# Patient Record
Sex: FEMALE | Race: WHITE | HISPANIC OR LATINO | Employment: FULL TIME | URBAN - METROPOLITAN AREA
[De-identification: names, ages, dates, MRNs, and addresses within clinical notes are randomized per-mention and may not be internally consistent; named-entity substitution may affect disease eponyms.]

---

## 2017-01-12 ENCOUNTER — ROUTINE PRENATAL (OUTPATIENT)
Dept: OBGYN | Facility: CLINIC | Age: 22
End: 2017-01-12
Payer: MEDICAID

## 2017-01-12 VITALS — DIASTOLIC BLOOD PRESSURE: 60 MMHG | WEIGHT: 151 LBS | SYSTOLIC BLOOD PRESSURE: 112 MMHG | BODY MASS INDEX: 27.61 KG/M2

## 2017-01-12 DIAGNOSIS — Z34.01 ENCOUNTER FOR SUPERVISION OF NORMAL FIRST PREGNANCY IN FIRST TRIMESTER: Primary | ICD-10-CM

## 2017-01-12 PROCEDURE — 90040 PR PRENATAL FOLLOW UP: CPT | Performed by: PHYSICIAN ASSISTANT

## 2017-01-12 NOTE — MR AVS SNAPSHOT
Shira Sanchez   2017 3:45 PM   Routine Prenatal   MRN: [id number]    [facility]   Dept Phone:  [phone]    Description:  Female : 1995   Provider:  TONI Wayne           Allergies as of 2017     Allergen Noted Reactions    Latex 2016   Rash    As a child during dental work.      Vital Signs     Blood Pressure Weight Last Menstrual Period Smoking Status          112/60 mmHg 68.493 kg (151 lb) 2016 (Exact Date) Never Smoker         Basic Information     Date Of Birth Sex Race Ethnicity Preferred Language    1995 Female Other  Origin (Puerto Rican,Jordanian,Pakistani,Turkish, etc) English      Your appointments     2017  3:45 PM   OB Follow Up with TONI Wayne   [facility] [address] [address]   [phone]              Problem List              ICD-10-CM Priority Class Noted - Resolved    Encounter for supervision of normal first pregnancy in first trimester Z34.01   2016 - Present      Health Maintenance        Date Due Completion Dates    IMM HEP B VACCINE (1 of 3 - Primary Series) 1997 ---    IMM HEP A VACCINE (1 of 2 - Standard Series) 1998 ---    IMM HPV VACCINE (1 of 3 - Female 3 Dose Series) 2008 ---    IMM VARICELLA (CHICKENPOX) VACCINE (1 of 2 - 2 Dose Adolescent Series) 2010 ---    IMM MENINGOCOCCAL VACCINE (MCV4) (1 of 1) 2013 ---    IMM DTaP/Tdap/Td Vaccine (1 - Tdap) 2016 ---    IMM INFLUENZA (1) 2016 ---            Current Immunizations     No immunizations on file.      Below and/or attached are the medications your provider expects you to take. Review all of your home medications and newly ordered medications with your provider and/or pharmacist. Follow medication instructions as directed by your provider and/or pharmacist. Please keep your medication list with you and share with your provider. Update the information when  medications are discontinued, doses are changed, or new medications (including over-the-counter products) are added; and carry medication information at all times in the event of emergency situations     Allergies:  LATEX - Rash               Medications  Valid as of: January 12, 2017 -  3:21 PM    Generic Name Brand Name Tablet Size Instructions for use    Prenatal MV-Min-Fe Fum-FA-DHA   Take  by mouth.        .                 Medicines prescribed today were sent to:     [facility] #[id number] - [place], [place] - [address]    [address] [address]    Phone: [phone] Fax: [phone]    Open 24 Hours?: No      Medication refill instructions:       If your prescription bottle indicates you have medication refills left, it is not necessary to call your provider’s office. Please contact your pharmacy and they will refill your medication.    If your prescription bottle indicates you do not have any refills left, you may request refills at any time through one of the following ways: The online [employer] system (except Urgent Care), by calling your provider’s office, or by asking your pharmacy to contact your provider’s office with a refill request. Medication refills are processed only during regular business hours and may not be available until the next business day. Your provider may request additional information or to have a follow-up visit with you prior to refilling your medication.   *Please Note: Medication refills are assigned a new Rx number when refilled electronically. Your pharmacy may indicate that no refills were authorized even though a new prescription for the same medication is available at the pharmacy. Please request the medicine by name with the pharmacy before contacting your provider for a refill.        Other Notes About Your Plan     MARLYN Quick Access Code: Activation code not generated  Current [employer] Status: Active

## 2017-01-30 ENCOUNTER — ROUTINE PRENATAL (OUTPATIENT)
Dept: OBGYN | Facility: CLINIC | Age: 22
End: 2017-01-30
Payer: MEDICAID

## 2017-01-30 ENCOUNTER — HOSPITAL ENCOUNTER (OUTPATIENT)
Facility: MEDICAL CENTER | Age: 22
End: 2017-01-30
Attending: NURSE PRACTITIONER
Payer: MEDICAID

## 2017-01-30 VITALS — WEIGHT: 155 LBS | BODY MASS INDEX: 28.34 KG/M2 | SYSTOLIC BLOOD PRESSURE: 108 MMHG | DIASTOLIC BLOOD PRESSURE: 58 MMHG

## 2017-01-30 DIAGNOSIS — Z34.01 ENCOUNTER FOR SUPERVISION OF NORMAL FIRST PREGNANCY IN FIRST TRIMESTER: ICD-10-CM

## 2017-01-30 DIAGNOSIS — Z34.01 ENCOUNTER FOR SUPERVISION OF NORMAL FIRST PREGNANCY IN FIRST TRIMESTER: Primary | ICD-10-CM

## 2017-01-30 PROCEDURE — 87653 STREP B DNA AMP PROBE: CPT

## 2017-01-30 PROCEDURE — 90040 PR PRENATAL FOLLOW UP: CPT | Performed by: NURSE PRACTITIONER

## 2017-01-30 NOTE — PATIENT INSTRUCTIONS
1.  GBS obtained.          2.  Labor precautions given.  Instructions given on where to go.  Pt receptive to              education.          3.  Questions answered.          4.  D/w pt policies about GBS.        5.  Continue FKCs.          6.  Encouraged adequate water intake        7.  F/u 1 wk.

## 2017-01-30 NOTE — PROGRESS NOTES
S:  Pt is  at 35w3d here for routine OB follow up.  No concerns today.  Reports good FM.  Denies VB, LOF, RUCs, or vaginal DC.     O:  Please see above vitals.        FHTs: 125        Fundal ht: 35 cm.        S=D        Fetal position: vertex.    A:  IUP at 35w3d  Patient Active Problem List    Diagnosis Date Noted   • Encounter for supervision of normal first pregnancy in first trimester 2016       P:  1.  GBS obtained.          2.  Labor precautions given.  Instructions given on where to go.  Pt receptive to              education.          3.  Questions answered.          4.  D/w pt policies about GBS.        5.  Continue FKCs.          6.  Encouraged adequate water intake        7.  F/u 1 wk.

## 2017-01-30 NOTE — MR AVS SNAPSHOT
Shira Sanchez   2017 3:00 PM   Routine Prenatal   MRN: [id number]    [facility]   Dept Phone:  [phone]    Description:  Female : 1995   Provider:  DENISE Luevano           Allergies as of 2017     Allergen Noted Reactions    Latex 2016   Rash    As a child during dental work.      You were diagnosed with     Encounter for supervision of normal first pregnancy in first trimester   [600869]  -  Primary       Vital Signs     Blood Pressure Weight Last Menstrual Period Smoking Status          108/58 mmHg 70.308 kg (155 lb) 2016 (Exact Date) Never Smoker         Basic Information     Date Of Birth Sex Race Ethnicity Preferred Language    1995 Female Other  Origin (Citizen of [place],Citizen of the [place],German,Jonathan, etc) English      Problem List              ICD-10-CM Priority Class Noted - Resolved    Encounter for supervision of normal first pregnancy in first trimester Z34.01   2016 - Present      Health Maintenance        Date Due Completion Dates    IMM HEP B VACCINE (1 of 3 - Primary Series) 1995 ---    IMM HEP A VACCINE (1 of 2 - Standard Series) 1998 ---    IMM HPV VACCINE (1 of 3 - Female 3 Dose Series) 2008 ---    IMM VARICELLA (CHICKENPOX) VACCINE (1 of 2 - 2 Dose Adolescent Series) 2010 ---    IMM MENINGOCOCCAL VACCINE (MCV4) (1 of 1) 2013 ---    IMM DTaP/Tdap/Td Vaccine (1 - Tdap) 2016 ---    IMM INFLUENZA (1) 2016 ---            Current Immunizations     No immunizations on file.      Below and/or attached are the medications your provider expects you to take. Review all of your home medications and newly ordered medications with your provider and/or pharmacist. Follow medication instructions as directed by your provider and/or pharmacist. Please keep your medication list with you and share with your provider. Update the information when medications are discontinued, doses are changed, or new  medications (including over-the-counter products) are added; and carry medication information at all times in the event of emergency situations     Allergies:  LATEX - Rash               Medications  Valid as of: January 30, 2017 -  3:29 PM    Generic Name Brand Name Tablet Size Instructions for use    Prenatal MV-Min-Fe Fum-FA-DHA   Take  by mouth.        .                 Medicines prescribed today were sent to:     [facility] #[id number] - [place], [place] - [address]    [address] [address]    Phone: [phone] Fax: [phone]    Open 24 Hours?: No      Medication refill instructions:       If your prescription bottle indicates you have medication refills left, it is not necessary to call your provider’s office. Please contact your pharmacy and they will refill your medication.    If your prescription bottle indicates you do not have any refills left, you may request refills at any time through one of the following ways: The online [employer] system (except Urgent Care), by calling your provider’s office, or by asking your pharmacy to contact your provider’s office with a refill request. Medication refills are processed only during regular business hours and may not be available until the next business day. Your provider may request additional information or to have a follow-up visit with you prior to refilling your medication.   *Please Note: Medication refills are assigned a new Rx number when refilled electronically. Your pharmacy may indicate that no refills were authorized even though a new prescription for the same medication is available at the pharmacy. Please request the medicine by name with the pharmacy before contacting your provider for a refill.        Your To Do List     Future Labs/Procedures Complete By Expires    GRP B STREP, BY PCR (DOSHI BROTH)  As directed 1/31/2018    Comments:    SOURCE: VAGINAL and RECTAL      Instructions          1.  GBS obtained.          2.   Labor precautions given.  Instructions given on where to go.  Pt receptive to              education.          3.  Questions answered.          4.  D/w pt policies about GBS.        5.  Continue FKCs.          6.  Encouraged adequate water intake        7.  F/u 1 wk.         Other Notes About Your Plan     MARLYN Quick Access Code: Activation code not generated  Current [employer] Status: Active

## 2017-01-30 NOTE — PROGRESS NOTES
Pt here today for OB follow up  GBS to be done today  Reports +FM  WT: 155 lb  BP: 108/58   Pt states no complications today  Good # [phone]

## 2017-02-01 LAB — GP B STREP DNA SPEC QL NAA+PROBE: NEGATIVE

## 2017-02-08 ENCOUNTER — ROUTINE PRENATAL (OUTPATIENT)
Dept: OBGYN | Facility: CLINIC | Age: 22
End: 2017-02-08
Payer: MEDICAID

## 2017-02-08 VITALS — WEIGHT: 153 LBS | DIASTOLIC BLOOD PRESSURE: 60 MMHG | SYSTOLIC BLOOD PRESSURE: 110 MMHG | BODY MASS INDEX: 27.98 KG/M2

## 2017-02-08 DIAGNOSIS — Z34.01 ENCOUNTER FOR SUPERVISION OF NORMAL FIRST PREGNANCY IN FIRST TRIMESTER: Primary | ICD-10-CM

## 2017-02-08 PROCEDURE — 90040 PR PRENATAL FOLLOW UP: CPT | Performed by: NURSE PRACTITIONER

## 2017-02-08 NOTE — MR AVS SNAPSHOT
Shira Sanchez   2017 3:15 PM   Routine Prenatal   MRN: [id number]    [facility]   Dept Phone:  [phone]    Description:  Female : 1995   Provider:  DENISE Luevano           Allergies as of 2017     Allergen Noted Reactions    Latex 2016   Rash    As a child during dental work.      You were diagnosed with     Encounter for supervision of normal first pregnancy in first trimester   [[id number]]  -  Primary       Vital Signs     Blood Pressure Weight Last Menstrual Period Smoking Status          110/60 mmHg 69.4 kg (153 lb) 2016 (Exact Date) Never Smoker         Basic Information     Date Of Birth Sex Race Ethnicity Preferred Language    1995 Female Other  Origin (Macedonian,[place]deinig,Cook Islander,Jonathan, etc) English      Your appointments     2017  3:15 PM   OB Follow Up with SHARONA MENG   [facility] [address]  [facility] [address]   [phone]            2017  3:15 PM   OB Follow Up with Loretta Fischer D.N.P.   [facility] [address]  [place] [address]   [phone]            2017  3:15 PM   OB Follow Up with Loretta Fischer D.N.P.   [facility] [address]  [facility] [address]   [phone]              Problem List              ICD-10-CM Priority Class Noted - Resolved    Encounter for supervision of normal first pregnancy in first trimester Z34.01   2016 - Present      Health Maintenance        Date Due Completion Dates    IMM HEP B VACCINE (1 of 3 - Primary Series) 1997 ---    IMM HEP A VACCINE (1 of 2 - Standard Series) 1998 ---    IMM HPV VACCINE (1 of 3 - Female 3 Dose Series) 2008 ---    IMM VARICELLA (CHICKENPOX) VACCINE (1 of 2 - 2 Dose Adolescent Series) 2010 ---    IMM MENINGOCOCCAL VACCINE (MCV4) (1 of 1) 2013 ---    IMM DTaP/Tdap/Td Vaccine (1 - Tdap) 2016 ---    IMM INFLUENZA (1)  9/1/2016 ---            Current Immunizations     No immunizations on file.      Below and/or attached are the medications your provider expects you to take. Review all of your home medications and newly ordered medications with your provider and/or pharmacist. Follow medication instructions as directed by your provider and/or pharmacist. Please keep your medication list with you and share with your provider. Update the information when medications are discontinued, doses are changed, or new medications (including over-the-counter products) are added; and carry medication information at all times in the event of emergency situations     Allergies:  LATEX - Rash               Medications  Valid as of: February 08, 2017 -  3:39 PM    Generic Name Brand Name Tablet Size Instructions for use    Prenatal MV-Min-Fe Fum-FA-DHA   Take  by mouth.        .                 Medicines prescribed today were sent to:     [facility] #[id number] - [place], [place] - [address]    [address] [address]    Phone: [phone] Fax: [phone]    Open 24 Hours?: No      Medication refill instructions:       If your prescription bottle indicates you have medication refills left, it is not necessary to call your provider’s office. Please contact your pharmacy and they will refill your medication.    If your prescription bottle indicates you do not have any refills left, you may request refills at any time through one of the following ways: The online [employer] system (except Urgent Care), by calling your provider’s office, or by asking your pharmacy to contact your provider’s office with a refill request. Medication refills are processed only during regular business hours and may not be available until the next business day. Your provider may request additional information or to have a follow-up visit with you prior to refilling your medication.   *Please Note: Medication refills are assigned a new Rx number when  refilled electronically. Your pharmacy may indicate that no refills were authorized even though a new prescription for the same medication is available at the pharmacy. Please request the medicine by name with the pharmacy before contacting your provider for a refill.        Instructions         1.  Continue FKCs.         2.  Labor precautions given.  Instructions given on where to go.  Pt receptive to education.         3.  D/w pt IOL policy.  IOL request will be sent if not in labor by 40 wks.        4.  Questions answered.         5.  Encouraged adequate water intake       6.  F/u 1wk         Other Notes About Your Plan     MARLYN Quick Access Code: Activation code not generated  Current [employer] Status: Active

## 2017-02-08 NOTE — PATIENT INSTRUCTIONS
1.  Continue FKCs.         2.  Labor precautions given.  Instructions given on where to go.  Pt receptive to education.         3.  D/w pt IOL policy.  IOL request will be sent if not in labor by 40 wks.        4.  Questions answered.         5.  Encouraged adequate water intake       6.  F/u 1wk

## 2017-02-08 NOTE — PROGRESS NOTES
Pt here today for OB follow up  Pt states having some spotting and cramping two days ago.   Reports +FM  WT:153lb  BP:110/60  Good # [phone]  gbs negative

## 2017-02-08 NOTE — PROGRESS NOTES
S:  Pt is  at 36w5d here for routine OB follow up.  No concerns today.  Reports good FM.  Denies VB, LOF, RUCs, or vaginal DC.     O:  Please see above vitals.        FHTs: 125        Fundal ht: 36 cm        Fetal position: vertex        SVE: deferred        GBS negative -- reviewed w pt.      A:  IUP at 36w5d  Patient Active Problem List    Diagnosis Date Noted   • Encounter for supervision of normal first pregnancy in first trimester 2016       P:  1.  Continue FKCs.         2.  Labor precautions given.  Instructions given on where to go.  Pt receptive to education.         3.  D/w pt IOL policy.  IOL request will be sent if not in labor by 40 wks.        4.  Questions answered.         5.  Encouraged adequate water intake       6.  F/u 1wk

## 2017-02-20 ENCOUNTER — HOSPITAL ENCOUNTER (INPATIENT)
Facility: MEDICAL CENTER | Age: 22
LOS: 3 days | End: 2017-02-23
Attending: OBSTETRICS & GYNECOLOGY | Admitting: OBSTETRICS & GYNECOLOGY
Payer: MEDICAID

## 2017-02-20 LAB
BASOPHILS # BLD AUTO: 0.3 % (ref 0–1.8)
BASOPHILS # BLD: 0.04 K/UL (ref 0–0.12)
EOSINOPHIL # BLD AUTO: 0.02 K/UL (ref 0–0.51)
EOSINOPHIL NFR BLD: 0.2 % (ref 0–6.9)
ERYTHROCYTE [DISTWIDTH] IN BLOOD BY AUTOMATED COUNT: 45.1 FL (ref 35.9–50)
HCT VFR BLD AUTO: 39.7 % (ref 37–47)
HGB BLD-MCNC: 13.7 G/DL (ref 12–16)
HOLDING TUBE BB 8507: NORMAL
IMM GRANULOCYTES # BLD AUTO: 0.04 K/UL (ref 0–0.11)
IMM GRANULOCYTES NFR BLD AUTO: 0.3 % (ref 0–0.9)
LYMPHOCYTES # BLD AUTO: 0.92 K/UL (ref 1–4.8)
LYMPHOCYTES NFR BLD: 8 % (ref 22–41)
MCH RBC QN AUTO: 32.7 PG (ref 27–33)
MCHC RBC AUTO-ENTMCNC: 34.5 G/DL (ref 33.6–35)
MCV RBC AUTO: 94.7 FL (ref 81.4–97.8)
MONOCYTES # BLD AUTO: 0.93 K/UL (ref 0–0.85)
MONOCYTES NFR BLD AUTO: 8.1 % (ref 0–13.4)
NEUTROPHILS # BLD AUTO: 9.51 K/UL (ref 2–7.15)
NEUTROPHILS NFR BLD: 83.1 % (ref 44–72)
NRBC # BLD AUTO: 0 K/UL
NRBC BLD AUTO-RTO: 0 /100 WBC
PLATELET # BLD AUTO: 179 K/UL (ref 164–446)
PMV BLD AUTO: 11.5 FL (ref 9–12.9)
RBC # BLD AUTO: 4.19 M/UL (ref 4.2–5.4)
WBC # BLD AUTO: 11.5 K/UL (ref 4.8–10.8)

## 2017-02-20 PROCEDURE — 700111 HCHG RX REV CODE 636 W/ 250 OVERRIDE (IP)

## 2017-02-20 PROCEDURE — 770002 HCHG ROOM/CARE - OB PRIVATE (112)

## 2017-02-20 PROCEDURE — 85025 COMPLETE CBC W/AUTO DIFF WBC: CPT

## 2017-02-20 PROCEDURE — 0KQM0ZZ REPAIR PERINEUM MUSCLE, OPEN APPROACH: ICD-10-PCS | Performed by: OBSTETRICS & GYNECOLOGY

## 2017-02-20 PROCEDURE — 0UQC0ZZ REPAIR CERVIX, OPEN APPROACH: ICD-10-PCS | Performed by: OBSTETRICS & GYNECOLOGY

## 2017-02-20 PROCEDURE — 700101 HCHG RX REV CODE 250

## 2017-02-20 RX ORDER — SODIUM CHLORIDE, SODIUM LACTATE, POTASSIUM CHLORIDE, CALCIUM CHLORIDE 600; 310; 30; 20 MG/100ML; MG/100ML; MG/100ML; MG/100ML
INJECTION, SOLUTION INTRAVENOUS CONTINUOUS
Status: DISPENSED | OUTPATIENT
Start: 2017-02-20 | End: 2017-02-21

## 2017-02-20 RX ORDER — ROPIVACAINE HYDROCHLORIDE 2 MG/ML
INJECTION, SOLUTION EPIDURAL; INFILTRATION; PERINEURAL
Status: ACTIVE
Start: 2017-02-20 | End: 2017-02-21

## 2017-02-20 RX ORDER — ALUMINA, MAGNESIA, AND SIMETHICONE 2400; 2400; 240 MG/30ML; MG/30ML; MG/30ML
30 SUSPENSION ORAL EVERY 6 HOURS PRN
Status: DISCONTINUED | OUTPATIENT
Start: 2017-02-20 | End: 2017-02-21 | Stop reason: HOSPADM

## 2017-02-20 RX ORDER — ONDANSETRON 2 MG/ML
4 INJECTION INTRAMUSCULAR; INTRAVENOUS EVERY 6 HOURS PRN
Status: DISCONTINUED | OUTPATIENT
Start: 2017-02-20 | End: 2017-02-23 | Stop reason: HOSPADM

## 2017-02-20 RX ORDER — SODIUM CHLORIDE, SODIUM LACTATE, POTASSIUM CHLORIDE, CALCIUM CHLORIDE 600; 310; 30; 20 MG/100ML; MG/100ML; MG/100ML; MG/100ML
INJECTION, SOLUTION INTRAVENOUS
Status: COMPLETED
Start: 2017-02-20 | End: 2017-02-20

## 2017-02-20 RX ORDER — METHYLERGONOVINE MALEATE 0.2 MG/ML
0.2 INJECTION INTRAVENOUS
Status: COMPLETED | OUTPATIENT
Start: 2017-02-20 | End: 2017-02-21

## 2017-02-20 RX ORDER — ONDANSETRON 4 MG/1
4 TABLET, ORALLY DISINTEGRATING ORAL EVERY 6 HOURS PRN
Status: DISCONTINUED | OUTPATIENT
Start: 2017-02-20 | End: 2017-02-23 | Stop reason: HOSPADM

## 2017-02-20 RX ORDER — MISOPROSTOL 200 UG/1
800 TABLET ORAL
Status: COMPLETED | OUTPATIENT
Start: 2017-02-20 | End: 2017-02-21

## 2017-02-20 RX ADMIN — SODIUM CHLORIDE, POTASSIUM CHLORIDE, SODIUM LACTATE AND CALCIUM CHLORIDE 1000 ML: 600; 310; 30; 20 INJECTION, SOLUTION INTRAVENOUS at 17:25

## 2017-02-20 RX ADMIN — SODIUM CHLORIDE, POTASSIUM CHLORIDE, SODIUM LACTATE AND CALCIUM CHLORIDE: 600; 310; 30; 20 INJECTION, SOLUTION INTRAVENOUS at 18:12

## 2017-02-20 ASSESSMENT — LIFESTYLE VARIABLES
DO YOU DRINK ALCOHOL: NO
ALCOHOL_USE: NO
EVER_SMOKED: NEVER

## 2017-02-20 NOTE — IP AVS SNAPSHOT
2/23/2017          Shira Sanchez  [address]  [facility] [address]    Dear Shira:    [facility] wants to ensure your discharge home is safe and you or your loved ones have had all your questions answered regarding your care after you leave the hospital.    You may receive a telephone call within two days of your discharge.  This call is to make certain you understand your discharge instructions as well as ensure we provided you with the best care possible during your stay with us.     The call will only last approximately 3-5 minutes and will be done by a nurse.    Once again, we want to ensure your discharge home is safe and that you have a clear understanding of any next steps in your care.  If you have any questions or concerns, please do not hesitate to contact us, we are here for you.  Thank you for choosing [facility] for your healthcare needs.    Sincerely,    Amado Corcoran    [facility]

## 2017-02-20 NOTE — PROGRESS NOTES
Patient came in for ob check.,EFm applied and POC discussed.she is due 3-30 which makes her 38.3weeks.sshe is not leaking,but had some spotting.SVE   4/80/-2 vertex-Dr echavarria notified and patient went walking.

## 2017-02-20 NOTE — IP AVS SNAPSHOT
Home Care Instructions                                                                                                                Shira Sanchez   MRN: [id number]    Department:  POST PARTUM 31   2017           Follow-up Information     1. [facility] with [facility] Schedule an appointment as soon as possible for a visit in 5 weeks.    Specialty:  OB/Gyn    Contact information    [address] [place] [address]  Jon BAÑUELOS [id number]  [phone]         I assume responsibility for securing a follow-up [place] Screening blood test on my baby within the specified date range.    -                  Discharge Instructions       POSTPARTUM DISCHARGE INSTRUCTIONS FOR MOM    YOB: 1995   Age: 21 y.o.               Admit Date: 2017     Discharge Date: 2017  Attending Doctor:  Catrachita Buitrago*                  Allergies:  Latex    Discharged to home by car. Discharged via wheelchair, hospital escort: Yes.  Special equipment needed: Not Applicable  Belongings with: Personal  Be sure to schedule a follow-up appointment with your primary care doctor or any specialists as instructed.     Discharge Plan:   Diet Plan: Discussed  Activity Level: Discussed  Confirmed Follow up Appointment: Patient to Call and Schedule Appointment  Confirmed Symptoms Management: Discussed  Medication Reconciliation Updated: Yes  Influenza Vaccine Indication: Patient Refuses    REASONS TO CALL YOUR OBSTETRICIAN:  1.   Persistent fever or shaking chills (Temperature higher than 100.4)  2.   Heavy bleeding (soaking more than 1 pad per hour); Passing clots  3.   Foul odor from vagina  4.   Mastitis (Breast infection; breast pain, chills, fever, redness)  5.   Urinary pain, burning or frequency  6.   Episiotomy infection  7.   Severe depression longer than 24 hours    HAND WASHING  · Prior to handling the baby.  · Before breastfeeding or bottle feeding baby.  · After using the bathroom or changing the baby's  "diaper.    VAGINAL CARE  · Nothing inside vagina for 6 weeks: no sexual intercourse, tampons or douching.  · Bleeding may continue for 2-4 weeks.  Amount may vary.    · Call your physician for heavy bleeding which means soaking more than 1 pad per hour    BIRTH CONTROL  · It is possible to become pregnant at any time after delivery and while breastfeeding.  · Plan to discuss a method of birth control with your physician at your follow up visit. visit.    DIET AND ELIMINATION  · Eating more fiber (bran cereal, fruits, and vegetables) and drinking plenty of fluids will help to avoid constipation.  · Urinary frequency after childbirth is normal.    POSTPARTUM BLUES  During the first few days after birth, you may experience a sense of the \"blues\" which may include impatience, irritability or even crying.  These feeling come and go quickly.  However, as many as 1 in 10 women experience emotional symptoms known as postpartum depression.    Postpartum depression:  May start as early as the second or third day after delivery or take several weeks or months to develop.  Symptoms of \"blues\" are present, but are more intense:  Crying spells; loss of appetite; feelings of hopelessness or loss of control; fear of touching the baby; over concern or no concern at all about the baby; little or no concern about your own appearance/caring for yourself; and/or inability to sleep or excessive sleeping.  Contact your physician if you are experiencing any of these symptoms.    Crisis Hotline:  · [place] Crisis Hotline:  0-342-ZMQBEBD  Or 1-401.805.4641  · [place] Crisis Hotline:  1-595.794.6729  Or 576-743-3933    PREVENTING SHAKEN BABY:  If you are angry or stressed, PUT THE BABY IN THE CRIB, step away, take some deep breaths, and wait until you are calm to care for the baby.  DO NOT SHAKE THE BABY.  You are not alone, call a supporter for help.    · Crisis Call Center 24/7 crisis line 221-350-3088 or 1-597.362.5970  · You can also text " "them, text \"ANSWER\" to 503049    QUIT SMOKING/TOBACCO USE:  I understand the use of any tobacco products increases my chance of suffering from future heart disease and could cause other illnesses which may shorten my life. Quitting the use of tobacco products is the single most important thing I can do to improve my health. For further information on smoking / tobacco cessation call a Toll Free Quit Line at 1-825.820.1571 (*[employer]) or 1-497.383.7476 (American Lung Association) or you can access the web based program at www.lungusa.org.    · [place] Tobacco Users Help Line:  (143) 961-5082       Toll Free: 1-297.594.4484  · Quit Tobacco Program [facility] (113)879-6468    DEPRESSION / SUICIDE RISK:  As you are discharged from this [facility], it is important to learn how to keep safe from harming yourself.    Recognize the warning signs:  · Abrupt changes in personality, positive or negative- including increase in energy   · Giving away possessions  · Change in eating patterns- significant weight changes-  positive or negative  · Change in sleeping patterns- unable to sleep or sleeping all the time   · Unwillingness or inability to communicate  · Depression  · Unusual sadness, discouragement and loneliness  · Talk of wanting to die  · Neglect of personal appearance   · Rebelliousness- reckless behavior  · Withdrawal from people/activities they love  · Confusion- inability to concentrate     If you or a loved one observes any of these behaviors or has concerns about self-harm, here's what you can do:  · Talk about it- your feelings and reasons for harming yourself  · Remove any means that you might use to hurt yourself (examples: pills, rope, extension cords, firearm)  · Get professional help from the community (Mental Health, Substance Abuse, psychological counseling)  · Do not be alone:Call your Safe Contact- someone whom you trust who will be there for " you.  · Call your local CRISIS HOTLINE 635-0558 or 991-104-1753  · Call your local Children's Mobile Crisis Response Team [place] (750) 898-1754 or www.SecureLink  · Call the toll free National Suicide Prevention Hotlines   · National Suicide Prevention Lifeline 116-427-VITY (3366)  · National Hope Line Network 800-SUICIDE (788-6963)    DISCHARGE SURVEY:  Thank you for choosing [facility].  We hope we provided you with very good care.  You may be receiving a survey in the mail.  Please fill it out.  Your opinion is valuable to us.    ADDITIONAL EDUCATIONAL MATERIALS GIVEN TO PATIENT:        My signature on this form indicates that:  1.  I have reviewed and understand the above information  2.  My questions regarding this information have been answered to my satisfaction.  3.  I have formulated a plan with my discharge nurse to obtain my prescribed medication for home.         Discharge Medication Instructions:    Below are the medications your physician expects you to take upon discharge:    Review all your home medications and newly ordered medications with your doctor and/or pharmacist. Follow medication instructions as directed by your doctor and/or pharmacist.    Please keep your medication list with you and share with your physician.               Medication List      START taking these medications        Instructions     MG Caps   Last time this was given:  100 mg on 2/23/2017  8:32 AM    Take 100 mg by mouth 2 times a day as needed for Constipation.   Dose:  100 mg       ferrous sulfate 325 (65 FE) MG tablet   Last time this was given:  325 mg on 2/23/2017  8:32 AM    Take 1 Tab by mouth 3 times a day.   Dose:  325 mg       ibuprofen 600 MG Tabs   Last time this was given:  600 mg on 2/23/2017  4:55 AM   Commonly known as:  MOTRIN    Take 1 Tab by mouth every 6 hours as needed (For cramping after delivery; do not give if patient is receiving ketorolac (Toradol)).   Dose:  600 mg          CONTINUE taking these medications        Instructions    PRENATAL 1 PO    Take  by mouth.               Crisis Hotline:     [place] Crisis Hotline:  9-038-QQUYDXI or 1-876.273.7503    [place] Crisis Hotline:    1-155.386.7104 or 655-694-5679        Disclaimer           _____________________________________                     __________       ________       Patient/Mother Signature or Legal                          Date                   Time

## 2017-02-20 NOTE — IP AVS SNAPSHOT
Dopplr Access Code: Activation code not generated  Current Dopplr Status: Active    [employer]  A secure, online tool to manage your health information     [employer]’s Dopplr® is a secure, online tool that connects you to your personalized health information from the privacy of your home -- day or night - making it very easy for you to manage your healthcare. Once the activation process is completed, you can even access your medical information using the Dopplr muriel, which is available for free in the Apple Muriel store or Google Play store.     Dopplr provides the following levels of access (as shown below):   My Chart Features   [facility] Primary Care Doctor [facility]  Specialists [facility]  Urgent  Care Non-[facility]  Primary Care  Doctor   Email your healthcare team securely and privately 24/7 X X X X   Manage appointments: schedule your next appointment; view details of past/upcoming appointments X      Request prescription refills. X      View recent personal medical records, including lab and immunizations X X X X   View health record, including health history, allergies, medications X X X X   Read reports about your outpatient visits, procedures, consult and ER notes X X X X   See your discharge summary, which is a recap of your hospital and/or ER visit that includes your diagnosis, lab results, and care plan. X X       How to register for Dopplr:  1. Go to  [email or web address].  2. Click on the Sign Up Now box, which takes you to the New Member Sign Up page. You will need to provide the following information:  a. Enter your Dopplr Access Code exactly as it appears at the top of this page. (You will not need to use this code after you’ve completed the sign-up process. If you do not sign up before the expiration date, you must request a new code.)   b. Enter your date of birth.   c. Enter your home email address.   d. Click Submit, and follow the next screen’s instructions.  3. Create a Dopplr ID. This will  be your [employer] login ID and cannot be changed, so think of one that is secure and easy to remember.  4. Create a [employer] Play password. You can change your password at any time.  5. Enter your Password Reset Question and Answer. This can be used at a later time if you forget your password.   6. Enter your e-mail address. This allows you to receive e-mail notifications when new information is available in [employer].  7. Click Sign Up. You can now view your health information.    For assistance activating your [employer] Play account, call [phone]

## 2017-02-21 LAB
ERYTHROCYTE [DISTWIDTH] IN BLOOD BY AUTOMATED COUNT: 46.9 FL (ref 35.9–50)
HCT VFR BLD AUTO: 31.5 % (ref 37–47)
HGB BLD-MCNC: 10.7 G/DL (ref 12–16)
MCH RBC QN AUTO: 33 PG (ref 27–33)
MCHC RBC AUTO-ENTMCNC: 34 G/DL (ref 33.6–35)
MCV RBC AUTO: 97.2 FL (ref 81.4–97.8)
PLATELET # BLD AUTO: 167 K/UL (ref 164–446)
PMV BLD AUTO: 11.3 FL (ref 9–12.9)
RBC # BLD AUTO: 3.24 M/UL (ref 4.2–5.4)
WBC # BLD AUTO: 14.7 K/UL (ref 4.8–10.8)

## 2017-02-21 PROCEDURE — 700112 HCHG RX REV CODE 229: Performed by: PHYSICIAN ASSISTANT

## 2017-02-21 PROCEDURE — 304965 HCHG RECOVERY SERVICES

## 2017-02-21 PROCEDURE — 700102 HCHG RX REV CODE 250 W/ 637 OVERRIDE(OP): Performed by: OBSTETRICS & GYNECOLOGY

## 2017-02-21 PROCEDURE — 36415 COLL VENOUS BLD VENIPUNCTURE: CPT

## 2017-02-21 PROCEDURE — A9270 NON-COVERED ITEM OR SERVICE: HCPCS | Performed by: PHYSICIAN ASSISTANT

## 2017-02-21 PROCEDURE — 59409 OBSTETRICAL CARE: CPT

## 2017-02-21 PROCEDURE — 770002 HCHG ROOM/CARE - OB PRIVATE (112)

## 2017-02-21 PROCEDURE — 700111 HCHG RX REV CODE 636 W/ 250 OVERRIDE (IP): Performed by: OBSTETRICS & GYNECOLOGY

## 2017-02-21 PROCEDURE — 85027 COMPLETE CBC AUTOMATED: CPT

## 2017-02-21 PROCEDURE — 303615 HCHG EPIDURAL/SPINAL ANESTHESIA FOR LABOR

## 2017-02-21 PROCEDURE — 700102 HCHG RX REV CODE 250 W/ 637 OVERRIDE(OP): Performed by: PHYSICIAN ASSISTANT

## 2017-02-21 PROCEDURE — A9270 NON-COVERED ITEM OR SERVICE: HCPCS | Performed by: OBSTETRICS & GYNECOLOGY

## 2017-02-21 RX ORDER — VITAMIN A ACETATE, BETA CAROTENE, ASCORBIC ACID, CHOLECALCIFEROL, .ALPHA.-TOCOPHEROL ACETATE, DL-, THIAMINE MONONITRATE, RIBOFLAVIN, NIACINAMIDE, PYRIDOXINE HYDROCHLORIDE, FOLIC ACID, CYANOCOBALAMIN, CALCIUM CARBONATE, FERROUS FUMARATE, ZINC OXIDE, CUPRIC OXIDE 3080; 12; 120; 400; 1; 1.84; 3; 20; 22; 920; 25; 200; 27; 10; 2 [IU]/1; UG/1; MG/1; [IU]/1; MG/1; MG/1; MG/1; MG/1; MG/1; [IU]/1; MG/1; MG/1; MG/1; MG/1; MG/1
1 TABLET, FILM COATED ORAL EVERY MORNING
Status: DISCONTINUED | OUTPATIENT
Start: 2017-02-21 | End: 2017-02-23 | Stop reason: HOSPADM

## 2017-02-21 RX ORDER — FERROUS SULFATE 325(65) MG
325 TABLET ORAL 2 TIMES DAILY WITH MEALS
Status: DISCONTINUED | OUTPATIENT
Start: 2017-02-21 | End: 2017-02-23 | Stop reason: HOSPADM

## 2017-02-21 RX ORDER — IBUPROFEN 600 MG/1
600 TABLET ORAL EVERY 6 HOURS PRN
Status: DISCONTINUED | OUTPATIENT
Start: 2017-02-21 | End: 2017-02-23 | Stop reason: HOSPADM

## 2017-02-21 RX ORDER — DOCUSATE SODIUM 100 MG/1
100 CAPSULE, LIQUID FILLED ORAL 2 TIMES DAILY PRN
Status: DISCONTINUED | OUTPATIENT
Start: 2017-02-21 | End: 2017-02-23 | Stop reason: HOSPADM

## 2017-02-21 RX ORDER — MISOPROSTOL 200 UG/1
600 TABLET ORAL
Status: DISCONTINUED | OUTPATIENT
Start: 2017-02-21 | End: 2017-02-22

## 2017-02-21 RX ORDER — METHYLERGONOVINE MALEATE 0.2 MG/ML
0.2 INJECTION INTRAVENOUS
Status: DISCONTINUED | OUTPATIENT
Start: 2017-02-21 | End: 2017-02-22

## 2017-02-21 RX ORDER — OXYCODONE HYDROCHLORIDE AND ACETAMINOPHEN 5; 325 MG/1; MG/1
2 TABLET ORAL EVERY 4 HOURS PRN
Status: DISCONTINUED | OUTPATIENT
Start: 2017-02-21 | End: 2017-02-23 | Stop reason: HOSPADM

## 2017-02-21 RX ORDER — SODIUM CHLORIDE, SODIUM LACTATE, POTASSIUM CHLORIDE, CALCIUM CHLORIDE 600; 310; 30; 20 MG/100ML; MG/100ML; MG/100ML; MG/100ML
INJECTION, SOLUTION INTRAVENOUS PRN
Status: DISCONTINUED | OUTPATIENT
Start: 2017-02-21 | End: 2017-02-22

## 2017-02-21 RX ORDER — OXYCODONE HYDROCHLORIDE AND ACETAMINOPHEN 5; 325 MG/1; MG/1
1 TABLET ORAL EVERY 4 HOURS PRN
Status: DISCONTINUED | OUTPATIENT
Start: 2017-02-21 | End: 2017-02-23 | Stop reason: HOSPADM

## 2017-02-21 RX ADMIN — DOCUSATE SODIUM 100 MG: 100 CAPSULE ORAL at 08:39

## 2017-02-21 RX ADMIN — OXYTOCIN 125 ML/HR: 10 INJECTION, SOLUTION INTRAMUSCULAR; INTRAVENOUS at 03:07

## 2017-02-21 RX ADMIN — IBUPROFEN 600 MG: 600 TABLET, FILM COATED ORAL at 14:41

## 2017-02-21 RX ADMIN — Medication 325 MG: at 08:39

## 2017-02-21 RX ADMIN — Medication 1 TABLET: at 08:39

## 2017-02-21 RX ADMIN — SODIUM CHLORIDE, POTASSIUM CHLORIDE, SODIUM LACTATE AND CALCIUM CHLORIDE 1000 ML: 600; 310; 30; 20 INJECTION, SOLUTION INTRAVENOUS at 02:15

## 2017-02-21 RX ADMIN — METHYLERGONOVINE MALEATE 0.2 MG: 0.2 INJECTION, SOLUTION INTRAMUSCULAR; INTRAVENOUS at 02:13

## 2017-02-21 RX ADMIN — Medication 325 MG: at 17:27

## 2017-02-21 RX ADMIN — IBUPROFEN 600 MG: 600 TABLET, FILM COATED ORAL at 08:39

## 2017-02-21 ASSESSMENT — PAIN SCALES - GENERAL
PAINLEVEL_OUTOF10: 0
PAINLEVEL_OUTOF10: 0
PAINLEVEL_OUTOF10: 2
PAINLEVEL_OUTOF10: 4
PAINLEVEL_OUTOF10: 0
PAINLEVEL_OUTOF10: 4

## 2017-02-21 NOTE — PROGRESS NOTES
Kelli RN brought pt from labor and delivery. ID bands and cuddles checked and verified with labor and delivery nurse, Kelli. Patient oriented to room and surroundings. Skylight discussed. Bulb syring demonstration. Educated on emergency call light. ID bands and security issues discussed. Educated about the pink photo ID name badges. Educated about not sleeping with infant, no carrying infant in halls, and no leaving infant unattended. Assessment completed on patient. Discussed pain management. IV without redness and swelling. Family at bedside.  Encourage pt to call for any needs.

## 2017-02-21 NOTE — DELIVERY NOTE
DATE OF SERVICE:  2017    On 2017 at 0153, this 21-year-old  1, para 0  female with   an intrauterine pregnancy at 38 weeks and 4/7 days under epidural anesthesia,   delivered a viable male infant with Apgar scores of 8 and 9, weighing 7   pounds 8.5 ounces or 3415 g.  Patient progressed to complete, had a very dense   epidural, so the epidural was reduced and allowed the baby to labor down,   when patient felt sensation to push, she did, pushed for over 2-1/2 hours to a   +1, +2 station, but due to maternal exhaustion, Dr. Irizarry was called to   assist with a vacuum-assisted vaginal delivery.  Dr. Irizarry arrived and   assessed and the mushroom vac was applied to the baby's head, pumped to   adequate suction and with 1 contraction, no pop-offs, delivery was via normal   spontaneous vacuum-assisted vaginal delivery to a sterile field in an occiput   anterior position with a loose nuchal cord x1 reduced after delivery of the   infant.  Vacuum was removed and rest of the infant was delivered without   incident.  Patient was admitted in active labor, had a spontaneous rupture of   membranes with clear fluid and progressed to complete, received an epidural,   GBS is negative.  Infant was placed on maternal abdomen and bulb suctioned by   the waiting RN.  RT was called, the baby was vigorous immediately upon   delivery.  Cord was clamped by Dr. Irizarry, cord was cut by the father of   the baby.  An intact placenta with a 3-vessel cord delivered spontaneously at   0202.  Pitocin was then run wide open in the IV.  Patient's fundus was found   to be firm and there were no blood clots; however, patient had significant   bleeding, cervical laceration was noted at 6 o' clock and about 7-8 o'clock   where I assisted to Dr. Irizarry and a repair of the cervix with a 3-0   chromic under epidural anesthesia.  Vagina was also explored and a   first-degree right vaginal laceration and a  second-degree left   vaginal/perineal laceration was noted and both were repaired in a normal   fashion under epidural anesthesia with a 3-0 Vicryl.  Patient tolerated well.    After repair of the lacerations, the cervix was reexamined and found to have   excellent hemostasis.  Fundus was found to be firm.  Estimated blood loss 600   mL.  Dr. Hollingsworth is the attending.  We will get a stat CBC   immediately.       ____________________________________     CLARA VERGARA    APOLONIA / NTS    DD:  02/21/2017 02:58:15  DT:  02/21/2017 03:18:23    D#:  [id number]  Job#:  [id number]

## 2017-02-21 NOTE — PROGRESS NOTES
1715-report received from CLAUDETTE Schwab RN, care assumed, POC discussed, pt transferred to D  1725-IV started, bolus for epidural initiated  1750-Dr Maloney in room, epidural placed, tolerated well  1820-SROM clear fluid, echeverria placed, SVE 7/80/-1  1900-report given to TEOFILO Cruz RN, POC discussed

## 2017-02-21 NOTE — PROGRESS NOTES
Report received from Kristina FERGUSON @ the change of shift.  Patient was asleep upon report. Assumed care. Discussed plan of care especially on managing pain. She would like her Ibuprofen every 6 hours. Assessment done. Medicated for pain. Encouraged ambulation. Encouraged to call if with needs. Will check at intervals.

## 2017-02-21 NOTE — PROGRESS NOTES
"Called to room by RN, as pt is still pushing after 2 hours with progress, but not  yet. Will start Pitocin now, and consider trying different pushing techniques, like tug of war and on side. Pt seems to be getting tired and frustrated. State she \"is just ready.\"    Cervix: C/+1, some caput noted on head, and baby in OT position currently.   NST: Mod variability, variable decels with UCs, +accels.  TOCO: UCs q 1-4 min  Pit = 1 mU/min  T = 99.0    A/P: IUP at 38w4d - try different techniques for 30 more minutes and if no significant change, consider calling MD for assisance with delivery. Anticipate .   "

## 2017-02-21 NOTE — CARE PLAN
Problem: Potential for postpartum infection related to presence of episiotomy/vaginal tear and/or uterine contamination  Goal: Patient will be absent from signs and symptoms of infection  Outcome: PROGRESSING AS EXPECTED  Encouraged ambulation.     Problem: Alteration in comfort related to episiotomy, vaginal repair and/or after birth pains  Goal: Patient is able to ambulate, care for self and infant  Outcome: PROGRESSING AS EXPECTED  Will medicate for pain on schedule as per patient request.

## 2017-02-21 NOTE — PROGRESS NOTES
Pt resting very comfortably with epidural, denies any pressure or pain sensation. Epidural recently turned down to 10.     Cervix: C/+1  NST: Cat 1  TOCO: UCs q 1-3 min    A/P: IUP at 38w3d in active labor - consider reducing epidural even more, as pt has no sensation or urge to push. Start pushing when pt has sensation. Anticipate .

## 2017-02-21 NOTE — PROGRESS NOTES
"1900: Report received from Claudine HAYS RN at bedside.  Repositioned pt from R side to L side-wedged with peanut ball  : Phoned Dr. Maloney regarding epidural continuous rate.  Ok to titrate down from 12 to 10.    : ANÍBAL Blair PA-C at bedside.   : SVE complete/+1.  POC labor down 30min, decrease epidural from 10 to 8-pt still not feeling pressure.  : Pushing  0025: ANÍBAL Blair PA-C called for assessment  0028: 1 of pitocin started  0030: ANÍBAL Blair PA-C at bedside  0145: BLAKE Blair PA-C and Dr. Ratna Domínguez at beside.  Deceleration, pitocin off and 10L O2 by mask applied to pt.  0153: Vacuum applied, head delivered,  viable male.  APGAR 8/9  0202:  placenta, 3vc  0210: Dr. Ratna Domínguez back at bedside for repair  0213: Methergine given IM.  1000ml LR bolus and pitocin bolus.  VSS  0235: Per Dr. Ratna Domínguez and ANÍBAL Blair PA-C, stat cbc without diff.  Lab called to room 214 for lab draw  0300: CBC results available.  ANÍBAL Blair PA-C updated with results.  0440: Pt up to bathroom, gown and pad changed. Unable to void.  Pt nauseous and vomiting.  States she \"feels better\" after vomiting.  0445: Pt transferred to wheelchair from bathroom.  States she \"feels hot and dizzy.\"  VSS  0452: Pt transferred to PPU via wheelchair, 2nd RN pushing baby in crib.  0510: Report given to PHYLLIS Acevedo at bedside.  "

## 2017-02-21 NOTE — H&P
History and Physical      Shira Snachez is a 21 y.o. female  at 38w3d who presents for contractions    Subjective:   positive  For CTXS.   positive Feels pain   negative for LOF  negative for vaginal bleeding.   positive for fetal movement    ROS: A comprehensive review of systems was negative.    No past medical history on file.  No past surgical history on file.  OB History    Para Term  AB SAB TAB Ectopic Multiple Living   1               # Outcome Date GA Lbr Juan F/2nd Weight Sex Delivery Anes PTL Lv   1 Current                 Social History     Social History   • Marital Status: Single     Spouse Name: N/A   • Number of Children: N/A   • Years of Education: N/A     Occupational History   • Not on file.     Social History Main Topics   • Smoking status: Never Smoker    • Smokeless tobacco: Not on file   • Alcohol Use: No      Comment: Socially but not during pregnancy   • Drug Use: No   • Sexual Activity:     Partners: Male      Comment: None     Other Topics Concern   • Not on file     Social History Narrative     Allergies: Latex    Current facility-administered medications:   •  LACTATED RINGERS IV SOLN, , , ,   •  LR infusion, , Intravenous, Continuous, Catrachita Ocampo M.D.  •  fentaNYL (SUBLIMAZE) injection 50 mcg, 50 mcg, Intravenous, Q HOUR PRN, Catrachita Ocampo M.D.  •  fentaNYL (SUBLIMAZE) injection 100 mcg, 100 mcg, Intravenous, Q HOUR PRN, Catrachita Ocampo M.D.  •  mag hydrox-al hydrox-simeth (MAALOX PLUS ES or MYLANTA DS) suspension 30 mL, 30 mL, Oral, Q6HRS PRN, Catrachita Ocampo M.D.  •  oxytocin (PITOCIN) infusion (for induction), 0.5-20 nadine-units/min, Intravenous, Continuous, Catrachita Ocampo M.D.  •  methylergonovine (METHERGINE) injection 0.2 mg, 0.2 mg, Intramuscular, Once PRN, Catrachita Ocampo M.D.  •  misoprostol (CYTOTEC) tablet 800 mcg, 800 mcg, Rectal, Once PRN, Catrachita Ocampo M.D.  •  LACTATED  "RINGERS IV SOLN, , , ,   •  ROPIVACAINE HCL 2 MG/ML INJ SOLN, , , ,     Prenatal care with TPC starting at 13 weeks with following problems:  Patient Active Problem List    Diagnosis Date Noted   • Labor and delivery indication for care or intervention 02/20/2017   • Encounter for supervision of normal first pregnancy in first trimester 08/30/2016     Objective:      Blood pressure 128/62, pulse 92, temperature 36.8 °C (98.2 °F), height 1.549 m (5' 1\"), weight 69.4 kg (153 lb), last menstrual period 05/27/2016, unknown if currently breastfeeding.    General:   no acute distress, alert, cooperative   Skin:   normal   HEENT:  Sclera clear, anicteric   Lungs:   CTA bilateral   Heart:   S1, S2 normal, no murmur, click, rub or gallop, regular rate and rhythm, no pedal edema, no JVD, no hepatosplenomegaly   Abdomen:   gravid, NT   EFW:  [id number]   Pelvis:  adequate with gynecoid pelvis   FHT:  150 BPM   Uterine Size: S=D   Presentations: Cephalic   Cervix:     Dilation: 4    Effacement: 80    Station:  -2    Consistency: Soft    Position: Anterior     Lab Review  Lab:   Blood type: A     Recent Results (from the past 5880 hour(s))   POCT Pregnancy    Collection Time: 08/11/16  3:18 PM   Result Value Ref Range    POC Urine Pregnancy Test POSITIVE Negative    Internal Control Positive Positive     Internal Control Negative Negative    POCT Urinalysis    Collection Time: 08/30/16  9:50 AM   Result Value Ref Range    POC Color  Negative    POC Appearance  Negative    POC Leukocyte Esterase Trace Negative    POC Nitrites Negative Negative    POC Urobiligen  Negative (0.2) mg/dL    POC Protein Negative Negative mg/dL    POC Urine PH 6.0 5.0 - 8.0    POC Blood Negative Negative    POC Specific Gravity 1.020 <1.005 - >1.030    POC Ketones Negative Negative mg/dL    POC Biliruben  Negative mg/dL    POC Glucose Negative Negative mg/dL   CHLAMYDIA/GC PCR URINE OR SWAB    Collection Time: 08/30/16 11:28 AM   Result Value Ref Range "    Source GENITAL     C. trachomatis by PCR Negative Negative    N. gonorrhoeae by PCR Negative Negative   PREG CNTR PRENATAL PN    Collection Time: 08/30/16 11:36 AM   Result Value Ref Range    Color Lt. Yellow     Character Clear     Specific Gravity 1.015 <1.035    Ph 6.0 5.0-8.0    Glucose Negative Negative mg/dL    Ketones 10 (A) Negative mg/dL    Protein Negative Negative mg/dL    Bilirubin Negative Negative    Nitrite Negative Negative    Leukocyte Esterase Negative Negative    Occult Blood Negative Negative    Micro Urine Req see below     Culture Indicated No UA Culture    WBC 9.0 4.8 - 10.8 K/uL    RBC 3.92 (L) 4.20 - 5.40 M/uL    Hemoglobin 12.5 12.0 - 16.0 g/dL    Hematocrit 37.0 37.0 - 47.0 %    MCV 94.4 81.4 - 97.8 fL    MCH 31.9 27.0 - 33.0 pg    MCHC 33.8 33.6 - 35.0 g/dL    RDW 46.4 35.9 - 50.0 fL    Platelet Count 156 (L) 164 - 446 K/uL    MPV 12.2 9.0 - 12.9 fL    Neutrophils-Polys 73.30 (H) 44.00 - 72.00 %    Lymphocytes 16.50 (L) 22.00 - 41.00 %    Monocytes 8.90 0.00 - 13.40 %    Eosinophils 0.90 0.00 - 6.90 %    Basophils 0.20 0.00 - 1.80 %    Immature Granulocytes 0.20 0.00 - 0.90 %    Nucleated RBC 0.00 /100 WBC    Neutrophils (Absolute) 6.59 2.00 - 7.15 K/uL    Lymphs (Absolute) 1.48 1.00 - 4.80 K/uL    Monos (Absolute) 0.80 0.00 - 0.85 K/uL    Eos (Absolute) 0.08 0.00 - 0.51 K/uL    Baso (Absolute) 0.02 0.00 - 0.12 K/uL    Immature Granulocytes (abs) 0.02 0.00 - 0.11 K/uL    NRBC (Absolute) 0.00 K/uL    Rubella IgG Antibody 28.40 IU/mL    Syphilis, Treponemal Qual Non Reactive Non Reactive    Hepatitis B Surface Antigen Negative Negative   HIV ANTIBODIES    Collection Time: 08/30/16 11:36 AM   Result Value Ref Range    HIV Ag/Ab Combo Assay Non Reactive Non Reactive   OP PRENATAL PANEL-BLOOD BANK    Collection Time: 08/30/16 11:36 AM   Result Value Ref Range    ABO Grouping Only A     Rh Grouping Only POS     Antibody Screen Scrn NEG    GLUCOSE 1HR GESTATIONAL    Collection Time: 12/13/16  10:30 AM   Result Value Ref Range    Glucose, Post Dose 121 70 - 139 mg/dL   T.PALLIDUM AB EIA    Collection Time: 16 10:30 AM   Result Value Ref Range    Syphilis, Treponemal Qual Non Reactive Non Reactive   CBC WITHOUT DIFFERENTIAL    Collection Time: 16 10:30 AM   Result Value Ref Range    WBC 9.3 4.8 - 10.8 K/uL    RBC 3.80 (L) 4.20 - 5.40 M/uL    Hemoglobin 12.5 12.0 - 16.0 g/dL    Hematocrit 37.4 37.0 - 47.0 %    MCV 98.4 (H) 81.4 - 97.8 fL    MCH 32.9 27.0 - 33.0 pg    MCHC 33.4 (L) 33.6 - 35.0 g/dL    RDW 49.2 35.9 - 50.0 fL    Platelet Count 214 164 - 446 K/uL    MPV 11.5 9.0 - 12.9 fL   GRP B STREP, BY PCR (DOSHI BROTH)    Collection Time: 17  3:48 PM   Result Value Ref Range    Strep Gp B DNA PCR Negative Negative        Assessment:   Shira Sanchez at 38w3d  Labor status: active labor  Obstetrical history significant for   Patient Active Problem List    Diagnosis Date Noted   • Labor and delivery indication for care or intervention 2017   • Encounter for supervision of normal first pregnancy in first trimester 2016   .      Plan:     Admit to L&D  GBS Negative  Epidural PRN x pain mgt  Pitocin for augmentation  Anticipate

## 2017-02-22 LAB
ERYTHROCYTE [DISTWIDTH] IN BLOOD BY AUTOMATED COUNT: 47.1 FL (ref 35.9–50)
HCT VFR BLD AUTO: 22 % (ref 37–47)
HGB BLD-MCNC: 7.6 G/DL (ref 12–16)
MCH RBC QN AUTO: 33.9 PG (ref 27–33)
MCHC RBC AUTO-ENTMCNC: 34.5 G/DL (ref 33.6–35)
MCV RBC AUTO: 98.2 FL (ref 81.4–97.8)
PLATELET # BLD AUTO: 141 K/UL (ref 164–446)
PMV BLD AUTO: 11.4 FL (ref 9–12.9)
RBC # BLD AUTO: 2.24 M/UL (ref 4.2–5.4)
WBC # BLD AUTO: 13.3 K/UL (ref 4.8–10.8)

## 2017-02-22 PROCEDURE — A9270 NON-COVERED ITEM OR SERVICE: HCPCS | Performed by: PHYSICIAN ASSISTANT

## 2017-02-22 PROCEDURE — 700102 HCHG RX REV CODE 250 W/ 637 OVERRIDE(OP): Performed by: OBSTETRICS & GYNECOLOGY

## 2017-02-22 PROCEDURE — 770002 HCHG ROOM/CARE - OB PRIVATE (112)

## 2017-02-22 PROCEDURE — 700112 HCHG RX REV CODE 229: Performed by: PHYSICIAN ASSISTANT

## 2017-02-22 PROCEDURE — 36415 COLL VENOUS BLD VENIPUNCTURE: CPT

## 2017-02-22 PROCEDURE — 85027 COMPLETE CBC AUTOMATED: CPT

## 2017-02-22 PROCEDURE — A9270 NON-COVERED ITEM OR SERVICE: HCPCS | Performed by: OBSTETRICS & GYNECOLOGY

## 2017-02-22 PROCEDURE — 700102 HCHG RX REV CODE 250 W/ 637 OVERRIDE(OP): Performed by: PHYSICIAN ASSISTANT

## 2017-02-22 RX ORDER — DOCUSATE SODIUM 100 MG/1
100 CAPSULE, LIQUID FILLED ORAL 2 TIMES DAILY PRN
Status: DISCONTINUED | OUTPATIENT
Start: 2017-02-22 | End: 2017-02-22

## 2017-02-22 RX ORDER — MISOPROSTOL 200 UG/1
800 TABLET ORAL
Status: DISCONTINUED | OUTPATIENT
Start: 2017-02-22 | End: 2017-02-23 | Stop reason: HOSPADM

## 2017-02-22 RX ORDER — METHYLERGONOVINE MALEATE 0.2 MG/ML
0.2 INJECTION INTRAVENOUS
Status: DISCONTINUED | OUTPATIENT
Start: 2017-02-22 | End: 2017-02-23 | Stop reason: HOSPADM

## 2017-02-22 RX ORDER — SODIUM CHLORIDE, SODIUM LACTATE, POTASSIUM CHLORIDE, CALCIUM CHLORIDE 600; 310; 30; 20 MG/100ML; MG/100ML; MG/100ML; MG/100ML
INJECTION, SOLUTION INTRAVENOUS PRN
Status: DISCONTINUED | OUTPATIENT
Start: 2017-02-22 | End: 2017-02-23 | Stop reason: HOSPADM

## 2017-02-22 RX ORDER — VITAMIN A ACETATE, BETA CAROTENE, ASCORBIC ACID, CHOLECALCIFEROL, .ALPHA.-TOCOPHEROL ACETATE, DL-, THIAMINE MONONITRATE, RIBOFLAVIN, NIACINAMIDE, PYRIDOXINE HYDROCHLORIDE, FOLIC ACID, CYANOCOBALAMIN, CALCIUM CARBONATE, FERROUS FUMARATE, ZINC OXIDE, CUPRIC OXIDE 3080; 12; 120; 400; 1; 1.84; 3; 20; 22; 920; 25; 200; 27; 10; 2 [IU]/1; UG/1; MG/1; [IU]/1; MG/1; MG/1; MG/1; MG/1; MG/1; [IU]/1; MG/1; MG/1; MG/1; MG/1; MG/1
1 TABLET, FILM COATED ORAL EVERY MORNING
Status: DISCONTINUED | OUTPATIENT
Start: 2017-02-22 | End: 2017-02-22

## 2017-02-22 RX ADMIN — IBUPROFEN 600 MG: 600 TABLET, FILM COATED ORAL at 00:43

## 2017-02-22 RX ADMIN — Medication 1 TABLET: at 08:54

## 2017-02-22 RX ADMIN — Medication 325 MG: at 17:22

## 2017-02-22 RX ADMIN — Medication 325 MG: at 08:54

## 2017-02-22 RX ADMIN — DOCUSATE SODIUM 100 MG: 100 CAPSULE ORAL at 08:54

## 2017-02-22 RX ADMIN — DOCUSATE SODIUM 100 MG: 100 CAPSULE ORAL at 00:43

## 2017-02-22 RX ADMIN — IBUPROFEN 600 MG: 600 TABLET, FILM COATED ORAL at 08:54

## 2017-02-22 ASSESSMENT — PAIN SCALES - GENERAL
PAINLEVEL_OUTOF10: 0
PAINLEVEL_OUTOF10: 3
PAINLEVEL_OUTOF10: 0
PAINLEVEL_OUTOF10: 0
PAINLEVEL_OUTOF10: 3

## 2017-02-22 NOTE — CARE PLAN
Problem: Altered physiologic condition related to immediate post-delivery state and potential for bleeding/hemorrhage  Goal: Patient physiologically stable as evidenced by normal lochia, palpable uterine involution and vital signs within normal limits  Outcome: PROGRESSING AS EXPECTED  Fundus is firm and one finger breadth above the umbilicus. Lochia is light. Vital signs are within normal limits.     Problem: Alteration in comfort related to episiotomy, vaginal repair and/or after birth pains  Goal: Patient is able to ambulate, care for self and infant  Outcome: PROGRESSING AS EXPECTED  Patient reports pain of 0/10 and declines any pain medication at this time. Will continue to assess for pain.

## 2017-02-22 NOTE — CARE PLAN
Problem: Potential for postpartum infection related to presence of episiotomy/vaginal tear and/or uterine contamination  Goal: Patient will be absent from signs and symptoms of infection  Outcome: PROGRESSING AS EXPECTED  Encouraged ambulation.     Problem: Alteration in comfort related to episiotomy, vaginal repair and/or after birth pains  Goal: Patient is able to ambulate, care for self and infant  Outcome: PROGRESSING AS EXPECTED  Will medicate for pain as needed.

## 2017-02-22 NOTE — PROGRESS NOTES
Report received from Martha FERGUSON @ 0700. Patient was asleep. Assumed care. Discussed plan of care especially on managing pain. she would like to call for pain medication if needed.  Assessment done. Encouraged to call if with needs. Will check at intervals.

## 2017-02-22 NOTE — PROGRESS NOTES
Patient assessment complete. Fundus is firm with minimal discharge. Pt ambulates and voids. Plan of care discussed with patient and all questions answered. Encouraged to call with call light.   MOB could not latch infant, this RN assisted but no latch still. Started MOB on pump and Mother decided to use donor milk instead of formula. Donor milk consent signed and in infant's chart. Will continue to monitor.

## 2017-02-22 NOTE — PROGRESS NOTES
[facility] POSTPARTUM PROGRESS NOTE    PATIENT ID:  NAME:  Shira Sanchez  MRN:               [id number]  YOB: 1995     21 y.o. female  at 38w4d PPD#1 s/p      Subjective: Patient is doing well, minimal pain and bleeding. Voiding normally, tolerating regular diet. She requests to stay until tomorrow to work on breastfeeding. Pt denies lightheadedness/dizziness, headache, CP or palpitations.    Objective:    Filed Vitals:    17 0500 17 0806 17 1200 17   BP: 103/70 95/62 107/73 112/58   Pulse: 92 93 100 100   Temp: 36.9 °C (98.4 °F) 37 °C (98.6 °F) 36.4 °C (97.6 °F) 36.4 °C (97.6 °F)   Resp: 19 18 20 20   Height:       Weight:       SpO2: 98% 97%  98%     General: No acute distress, resting comfortably in bed.  HEENT: normocephalic, nontraumatic, PERRLA, EOMI  Cardiovascular: Heart RRR with no murmurs, rubs or gallops. Distal Pulses 2+  Respiratory: symmetric chest expansion, lungs CTA bilaterally with no wheezes rales or rhonci  Abdomen: soft, mildly tender, fundus firm, +BS  Genitourinary: lochia light, denies excessive vaginal bleeding  Musculoskeletal: strength 5/5 in four extremities  Neuro: non focal with no numbness, tingling or changes in sensation    Recent Labs      17   1725  17   0249  17   0447   WBC  11.5*  14.7*  13.3*   RBC  4.19*  3.24*  2.24*   HEMOGLOBIN  13.7  10.7*  7.6*   HEMATOCRIT  39.7  31.5*  22.0*   MCV  94.7  97.2  98.2*   MCH  32.7  33.0  33.9*   RDW  45.1  46.9  47.1   PLATELETCT  179  167  141*   MPV  11.5  11.3  11.4   NEUTSPOLYS  83.10*   --    --    LYMPHOCYTES  8.00*   --    --    MONOCYTES  8.10   --    --    EOSINOPHILS  0.20   --    --    BASOPHILS  0.30   --    --      No results for input(s): SODIUM, POTASSIUM, CHLORIDE, CO2, GLUCOSE, BUN, CPKTOTAL in the last 72 hours.    Current Meds:   Current Facility-Administered Medications   Medication Dose Frequency Provider Last Rate Last Dose   • ibuprofen (MOTRIN) tablet  600 mg  600 mg Q6HRS PRN Catrachita Ocampo M.D.   600 mg at 17 0043   • oxycodone-acetaminophen (PERCOCET) 5-325 MG per tablet 1 Tab  1 Tab Q4HRS PRN Catrachita Ocampo M.D.       • oxycodone-acetaminophen (PERCOCET) 5-325 MG per tablet 2 Tab  2 Tab Q4HRS PRN Catrachita Ocampo M.D.       • LR infusion   PRN Tutu Blair, P.A.       • methylergonovine (METHERGINE) injection 0.2 mg  0.2 mg Once PRN Tamstrisha Yateson, P.A.       • misoprostol (CYTOTEC) tablet 600 mcg  600 mcg Once PRN Tamstrisha Yateson, P.A.       • PRN oxytocin (PITOCIN) (20 Units/1000 mL) PRN for excessive uterine bleeding - See Admin Instr  125-999 mL/hr Once PRN Tamstrisha Yateson, P.A.       • docusate sodium (COLACE) capsule 100 mg  100 mg BID PRN Tamstrisha Yateson, P.A.   100 mg at 17 0043   • prenatal plus vitamin (STUARTNATAL 1+1) 27-1 MG tablet 1 Tab  1 Tab QAM Nicoleen RONNY Yateson, P.A.   1 Tab at 17 0839   • ferrous sulfate tablet 325 mg  325 mg BID WITH MEALS Tamstrisha Blair, P.A.   325 mg at 17 1727   • ondansetron (ZOFRAN ODT) dispertab 4 mg  4 mg Q6HRS PRN Catrachita Ocampo M.D.        Or   • ondansetron (ZOFRAN) syringe/vial injection 4 mg  4 mg Q6HRS PRN Catrachita Ocampo M.D.       • oxytocin (PITOCIN) infusion (for postpartum)   mL/hr Continuous Catrachita Ocampo M.D. 125 mL/hr at 17 0307 125 mL/hr at 17 0307     Last reviewed on 2017  6:06 PM by Claudine Vashishta, R.N.     Assessment:   21 y.o. female  at 38w4d PPD#1 s/p      Plan:   1. Routine post-partum care  2. Encourage ambulation  3. Ferrous sulfate 325mg BID  4. Disposition: Anticipate d/c PPD#2

## 2017-02-23 VITALS
OXYGEN SATURATION: 96 % | HEART RATE: 91 BPM | RESPIRATION RATE: 18 BRPM | WEIGHT: 153 LBS | DIASTOLIC BLOOD PRESSURE: 63 MMHG | TEMPERATURE: 97.7 F | SYSTOLIC BLOOD PRESSURE: 97 MMHG | BODY MASS INDEX: 28.89 KG/M2 | HEIGHT: 61 IN

## 2017-02-23 LAB
ERYTHROCYTE [DISTWIDTH] IN BLOOD BY AUTOMATED COUNT: 47.8 FL (ref 35.9–50)
HCT VFR BLD AUTO: 22.2 % (ref 37–47)
HGB BLD-MCNC: 7.5 G/DL (ref 12–16)
MCH RBC QN AUTO: 33 PG (ref 27–33)
MCHC RBC AUTO-ENTMCNC: 33.8 G/DL (ref 33.6–35)
MCV RBC AUTO: 97.8 FL (ref 81.4–97.8)
PLATELET # BLD AUTO: 161 K/UL (ref 164–446)
PMV BLD AUTO: 11.2 FL (ref 9–12.9)
RBC # BLD AUTO: 2.27 M/UL (ref 4.2–5.4)
WBC # BLD AUTO: 11.9 K/UL (ref 4.8–10.8)

## 2017-02-23 PROCEDURE — 700102 HCHG RX REV CODE 250 W/ 637 OVERRIDE(OP): Performed by: OBSTETRICS & GYNECOLOGY

## 2017-02-23 PROCEDURE — A9270 NON-COVERED ITEM OR SERVICE: HCPCS | Performed by: PHYSICIAN ASSISTANT

## 2017-02-23 PROCEDURE — 85027 COMPLETE CBC AUTOMATED: CPT

## 2017-02-23 PROCEDURE — 36415 COLL VENOUS BLD VENIPUNCTURE: CPT

## 2017-02-23 PROCEDURE — 700102 HCHG RX REV CODE 250 W/ 637 OVERRIDE(OP): Performed by: PHYSICIAN ASSISTANT

## 2017-02-23 PROCEDURE — 700112 HCHG RX REV CODE 229: Performed by: PHYSICIAN ASSISTANT

## 2017-02-23 PROCEDURE — A9270 NON-COVERED ITEM OR SERVICE: HCPCS | Performed by: OBSTETRICS & GYNECOLOGY

## 2017-02-23 RX ORDER — PSEUDOEPHEDRINE HCL 30 MG
100 TABLET ORAL 2 TIMES DAILY PRN
Qty: 60 CAP | Refills: 3 | Status: SHIPPED | OUTPATIENT
Start: 2017-02-23 | End: 2017-11-02

## 2017-02-23 RX ORDER — FERROUS SULFATE 325(65) MG
325 TABLET ORAL 3 TIMES DAILY
Qty: 90 TAB | Refills: 3 | Status: SHIPPED | OUTPATIENT
Start: 2017-02-23 | End: 2017-11-02

## 2017-02-23 RX ORDER — IBUPROFEN 600 MG/1
600 TABLET ORAL EVERY 6 HOURS PRN
Qty: 30 TAB | Refills: 3 | Status: SHIPPED | OUTPATIENT
Start: 2017-02-23 | End: 2017-11-02

## 2017-02-23 RX ADMIN — Medication 1 TABLET: at 08:32

## 2017-02-23 RX ADMIN — DOCUSATE SODIUM 100 MG: 100 CAPSULE ORAL at 08:32

## 2017-02-23 RX ADMIN — OXYCODONE HYDROCHLORIDE AND ACETAMINOPHEN 1 TABLET: 5; 325 TABLET ORAL at 04:55

## 2017-02-23 RX ADMIN — Medication 325 MG: at 08:32

## 2017-02-23 RX ADMIN — IBUPROFEN 600 MG: 600 TABLET, FILM COATED ORAL at 04:55

## 2017-02-23 ASSESSMENT — PAIN SCALES - GENERAL
PAINLEVEL_OUTOF10: 0
PAINLEVEL_OUTOF10: 2
PAINLEVEL_OUTOF10: 0
PAINLEVEL_OUTOF10: 0
PAINLEVEL_OUTOF10: 5
PAINLEVEL_OUTOF10: 0

## 2017-02-23 NOTE — PROGRESS NOTES
0730-Patient alert, oriented.  Vital signs stable. Father of baby at bedside.  Fundus firm@U, lochia light.  Patient using stef bottle, voiding without difficulty.  Patient denies complaints of pain at this time.  Encouraged to call with needs.  Will continue to monitor.  Patient bonding well with infant.  1200- Discharge education discussed with patient.  Patient verbalized understanding.  1245- Patient discharged home via wheelchair with infant in stable condition.

## 2017-02-23 NOTE — PROGRESS NOTES
Mother states baby continues having difficulty breastfeeding, flat/inverted nipples, mother has been attempting breastfeeding/pumping/supplementing, mother states she has [place] [facility], mother to call WIC office before discharging today to attempt to arrange for breast pump for home, HG rental pump information also provided, if unable to get WIC pump or rent HG pump mother to call and staff will order hand pump for her, encouraged to call after speaks with WIC representative, educated on importance of continuing to supplement since infant not breastfeeding, supplementation guidelines provided and explained and mother to supplement per guidelines with pumped MBM and/or formula.

## 2017-02-23 NOTE — DISCHARGE SUMMARY
[facility]  NORMAL SPONTANEOUS VAGINAL DISCHARGE SUMMARY    PATIENT ID:  NAME:  Shira Sanchez  MRN:               [id number]  YOB: 1995    DATE OF ADMISSION: 2017    DATE OF DISCHARGE: 2017     ADMITTING DIAGNOSIS:  1. Intrauterine pregnancy at 38w4d.      DISCHARGE DIAGNOSIS:  1. s/p   2. Cervical Laceration  3. Post-partum hemorrhage        HOSPITAL COURSE: This is a 21 y.o. year old  female admitted at 38w4d who presented with contractions in active labor. Pregnancy was uncomplicated. The patient had a good labor pattern after admission and proceeded to deliver a viable male infant weighing  7 lbs and 8.5 oz. Infants Apgars scores were 8 and 9 at one and five minutes. A cervical laceration was noted which was the source of the majority of the patient's blood loss, EBL 600cc. The patients postpartum course was uncomplicated however was notable for anemia, Hgb 7.5, patient was asymptomatic, tx with supplemental iron to be continued at home,  and she was discharged home in stable condition on postpartum day #2.    PROCEDURES PERFORMED: Normal spontaneous vaginal delivery over first degree right vaginal laceration and a second degree left vaginal laceration which were repaired with 3-0 vicryl in the usual sterile fashion. Cervical laceration was noted and repaired with 3-0 chromic.     COMPLICATIONS: Cervical laceration with post-partum hemorrhage, EBL 600cc    DIET: Regular    ACTIVITY: No intercourse and nothing inserted into the vagina for 5 weeks.    MEDICATIONS:  Current Outpatient Prescriptions   Medication Sig Dispense Refill   • ibuprofen (MOTRIN) 600 MG Tab Take 1 Tab by mouth every 6 hours as needed (For cramping after delivery; do not give if patient is receiving ketorolac (Toradol)). 30 Tab 3   • ferrous sulfate 325 (65 FE) MG tablet Take 1 Tab by mouth 3 times a day. 90 Tab 3   • docusate sodium 100 MG Cap Take 100 mg by mouth 2 times a day as needed for Constipation. 60 Cap 3          FOLLOWUP:  1) TPC in 5 weeks  2) Return to the hospital if copious vaginal bleeding or foul smelling discharge is noted

## 2017-02-23 NOTE — CARE PLAN
Problem: Altered physiologic condition related to immediate post-delivery state and potential for bleeding/hemorrhage  Goal: Patient physiologically stable as evidenced by normal lochia, palpable uterine involution and vital signs within normal limits  Outcome: PROGRESSING AS EXPECTED  Fundus firm @ U,lochia rubra minimal. V/S within defined limits.    Problem: Alteration in comfort related to episiotomy, vaginal repair and/or after birth pains  Goal: Patient verbalizes acceptable pain level  Outcome: PROGRESSING AS EXPECTED  Patient will be medicated as needed. Verbalized acceptable pain level @ this time.

## 2017-02-23 NOTE — DISCHARGE INSTRUCTIONS
POSTPARTUM DISCHARGE INSTRUCTIONS FOR MOM    YOB: 1995   Age: 21 y.o.               Admit Date: 2/20/2017     Discharge Date: 2/23/2017  Attending Doctor:  Catrachita Buitrago*                  Allergies:  Latex    Discharged to home by car. Discharged via wheelchair, hospital escort: Yes.  Special equipment needed: Not Applicable  Belongings with: Personal  Be sure to schedule a follow-up appointment with your primary care doctor or any specialists as instructed.     Discharge Plan:   Diet Plan: Discussed  Activity Level: Discussed  Confirmed Follow up Appointment: Patient to Call and Schedule Appointment  Confirmed Symptoms Management: Discussed  Medication Reconciliation Updated: Yes  Influenza Vaccine Indication: Patient Refuses    REASONS TO CALL YOUR OBSTETRICIAN:  1.   Persistent fever or shaking chills (Temperature higher than 100.4)  2.   Heavy bleeding (soaking more than 1 pad per hour); Passing clots  3.   Foul odor from vagina  4.   Mastitis (Breast infection; breast pain, chills, fever, redness)  5.   Urinary pain, burning or frequency  6.   Episiotomy infection  7.   Severe depression longer than 24 hours    HAND WASHING  · Prior to handling the baby.  · Before breastfeeding or bottle feeding baby.  · After using the bathroom or changing the baby's diaper.    VAGINAL CARE  · Nothing inside vagina for 6 weeks: no sexual intercourse, tampons or douching.  · Bleeding may continue for 2-4 weeks.  Amount may vary.    · Call your physician for heavy bleeding which means soaking more than 1 pad per hour    BIRTH CONTROL  · It is possible to become pregnant at any time after delivery and while breastfeeding.  · Plan to discuss a method of birth control with your physician at your follow up visit. visit.    DIET AND ELIMINATION  · Eating more fiber (bran cereal, fruits, and vegetables) and drinking plenty of fluids will help to avoid constipation.  · Urinary frequency after childbirth is  "normal.    POSTPARTUM BLUES  During the first few days after birth, you may experience a sense of the \"blues\" which may include impatience, irritability or even crying.  These feeling come and go quickly.  However, as many as 1 in 10 women experience emotional symptoms known as postpartum depression.    Postpartum depression:  May start as early as the second or third day after delivery or take several weeks or months to develop.  Symptoms of \"blues\" are present, but are more intense:  Crying spells; loss of appetite; feelings of hopelessness or loss of control; fear of touching the baby; over concern or no concern at all about the baby; little or no concern about your own appearance/caring for yourself; and/or inability to sleep or excessive sleeping.  Contact your physician if you are experiencing any of these symptoms.    Crisis Hotline:  · [place] Crisis Hotline:  3-729-XSMAJWX  Or 1-215.440.2407  · [place] Crisis Hotline:  1-632.631.6370  Or 730-891-1572    PREVENTING SHAKEN BABY:  If you are angry or stressed, PUT THE BABY IN THE CRIB, step away, take some deep breaths, and wait until you are calm to care for the baby.  DO NOT SHAKE THE BABY.  You are not alone, call a supporter for help.    · Crisis Call Center 24/7 crisis line 989-250-0633 or 1-931.429.5912  · You can also text them, text \"ANSWER\" to 429124    QUIT SMOKING/TOBACCO USE:  I understand the use of any tobacco products increases my chance of suffering from future heart disease and could cause other illnesses which may shorten my life. Quitting the use of tobacco products is the single most important thing I can do to improve my health. For further information on smoking / tobacco cessation call a Toll Free Quit Line at 1-213.915.5938 (*[employer] [place]) or 1-559.439.9004 (American [employer] Association) or you can access the web based program at www.lungusa.org.    · [place] Tobacco Users Help Line:  (281) 832-7915       Toll Free: " 5-528-426-9860  · Quit Tobacco Program [facility] (174)673-0894    DEPRESSION / SUICIDE RISK:  As you are discharged from this [facility], it is important to learn how to keep safe from harming yourself.    Recognize the warning signs:  · Abrupt changes in personality, positive or negative- including increase in energy   · Giving away possessions  · Change in eating patterns- significant weight changes-  positive or negative  · Change in sleeping patterns- unable to sleep or sleeping all the time   · Unwillingness or inability to communicate  · Depression  · Unusual sadness, discouragement and loneliness  · Talk of wanting to die  · Neglect of personal appearance   · Rebelliousness- reckless behavior  · Withdrawal from people/activities they love  · Confusion- inability to concentrate     If you or a loved one observes any of these behaviors or has concerns about self-harm, here's what you can do:  · Talk about it- your feelings and reasons for harming yourself  · Remove any means that you might use to hurt yourself (examples: pills, rope, extension cords, firearm)  · Get professional help from the community (Mental Health, Substance Abuse, psychological counseling)  · Do not be alone:Call your Safe Contact- someone whom you trust who will be there for you.  · Call your local CRISIS HOTLINE 121-1252 or 442-235-3662  · Call your local Children's Mobile Crisis Response Team [place] (970) 670-8946 or www.Bitmenu  · Call the toll free National Suicide Prevention Hotlines   · National Suicide Prevention Lifeline 433-401-MQJJ (8347)  · National Hope Line Network 800-SUICIDE (128-5692)    DISCHARGE SURVEY:  Thank you for choosing [facility].  We hope we provided you with very good care.  You may be receiving a survey in the mail.  Please fill it out.  Your opinion is valuable to us.    ADDITIONAL EDUCATIONAL MATERIALS GIVEN TO PATIENT:        My signature on this form  indicates that:  1.  I have reviewed and understand the above information  2.  My questions regarding this information have been answered to my satisfaction.  3.  I have formulated a plan with my discharge nurse to obtain my prescribed medication for home.

## 2017-03-03 ENCOUNTER — TELEPHONE (OUTPATIENT)
Dept: OBGYN | Facility: CLINIC | Age: 22
End: 2017-03-03

## 2017-03-03 ENCOUNTER — POST PARTUM (OUTPATIENT)
Dept: OBGYN | Facility: CLINIC | Age: 22
End: 2017-03-03
Payer: MEDICAID

## 2017-03-03 VITALS — DIASTOLIC BLOOD PRESSURE: 62 MMHG | BODY MASS INDEX: 25.33 KG/M2 | SYSTOLIC BLOOD PRESSURE: 110 MMHG | WEIGHT: 134 LBS

## 2017-03-03 DIAGNOSIS — N61.0 MASTITIS, LEFT, ACUTE: ICD-10-CM

## 2017-03-03 PROCEDURE — 90050 PR POSTPARTUM VISIT: CPT | Performed by: NURSE PRACTITIONER

## 2017-03-03 RX ORDER — DICLOXACILLIN SODIUM 250 MG/1
500 CAPSULE ORAL EVERY 6 HOURS
Qty: 80 CAP | Refills: 0 | Status: SHIPPED | OUTPATIENT
Start: 2017-03-03 | End: 2017-03-13

## 2017-03-03 NOTE — TELEPHONE ENCOUNTER
Pt called the triage line c/o breast pain in both breast and a fever.  I called Pt back- no answer-left message to call me back at [phone].

## 2017-03-03 NOTE — PATIENT INSTRUCTIONS
A/P: 1) Mastitis: abx sent to pharmacy.           2) Continue breastfeeding.  D/w pt helps.            3) F/u regular PP visit.

## 2017-03-03 NOTE — PROGRESS NOTES
S: Pt here for fit in c/o fevers/chills.  Doing well nursing at home.  States lochia has stopped.    O: T: 98.3       Left breast: red, warm to touch, painful       Right breast: wnl       Fundus firm 2fb above PS.        A/P: 1) Mastitis: abx sent to pharmacy.           2) Continue breastfeeding.  D/w pt helps.            3) F/u regular PP visit.

## 2017-03-03 NOTE — MR AVS SNAPSHOT
Shira Sanchez   3/3/2017 2:00 PM   Post Partum   MRN: [id number]    [facility]   Dept Phone:  [phone]    Description:  Female : 1995   Provider:  Sherie Ndiaye C.N.M.           Allergies as of 3/3/2017     Allergen Noted Reactions    Latex 2016   Rash    As a child during dental work.      You were diagnosed with     Postpartum care and examination of lactating mother   [V24.1.ICD-9-CM]  -  Primary     Mastitis, left, acute   [607298]         Vital Signs     Blood Pressure Weight Last Menstrual Period Breastfeeding? Smoking Status       110/62 mmHg 60.782 kg (134 lb) 2016 (Exact Date) Unknown Never Smoker        Basic Information     Date Of Birth Sex Race Ethnicity Preferred Language    1995 Female White  Origin (Pashto,Libyan,Georgian,Jonathan, etc) English      Your appointments     2017 10:00 AM   Post Partum with Sherie Ndiaye C.N.M.   [facility] ([facility] [place])    [address] [place] [id number] [phone]              Problem List              ICD-10-CM Priority Class Noted - Resolved    Encounter for supervision of normal first pregnancy in first trimester Z34.01   2016 - Present    Labor and delivery indication for care or intervention O75.9   2017 - Present      Health Maintenance        Date Due Completion Dates    IMM HEP B VACCINE (1 of 3 - Primary Series) 1997 ---    IMM HEP A VACCINE (1 of 2 - Standard Series) 1998 ---    IMM HPV VACCINE (1 of 3 - Female 3 Dose Series) 2008 ---    IMM VARICELLA (CHICKENPOX) VACCINE (1 of 2 - 2 Dose Adolescent Series) 2010 ---    IMM MENINGOCOCCAL VACCINE (MCV4) (1 of 1) 2013 ---    IMM DTaP/Tdap/Td Vaccine (1 - Tdap) 2016 ---    IMM INFLUENZA (1) 2016 ---            Current Immunizations     No immunizations on file.      Below and/or attached are the medications your provider expects you to take. Review all of your home  medications and newly ordered medications with your provider and/or pharmacist. Follow medication instructions as directed by your provider and/or pharmacist. Please keep your medication list with you and share with your provider. Update the information when medications are discontinued, doses are changed, or new medications (including over-the-counter products) are added; and carry medication information at all times in the event of emergency situations     Allergies:  LATEX - Rash               Medications  Valid as of: March 03, 2017 -  4:56 PM    Generic Name Brand Name Tablet Size Instructions for use    Dicloxacillin Sodium (Cap) DYNAPEN 250 MG Take 2 Caps by mouth every 6 hours for 10 days.        Docusate Sodium (Cap)  MG Take 100 mg by mouth 2 times a day as needed for Constipation.        Ferrous Sulfate (Tab) ferrous sulfate 325 (65 FE) MG Take 1 Tab by mouth 3 times a day.        Ibuprofen (Tab) MOTRIN 600 MG Take 1 Tab by mouth every 6 hours as needed (For cramping after delivery; do not give if patient is receiving ketorolac (Toradol)).        Prenatal MV-Min-Fe Fum-FA-DHA   Take  by mouth.        .                 Medicines prescribed today were sent to:     [facility][id number] - [place], [place] - [address]    [address] [address]    Phone: [phone] Fax: [phone]    Open 24 Hours?: No    [id number] - [place], [place] - [address]    [address] [address]    Phone: [phone] Fax: [phone]    Open 24 Hours?: No      Medication refill instructions:       If your prescription bottle indicates you have medication refills left, it is not necessary to call your provider’s office. Please contact your pharmacy and they will refill your medication.    If your prescription bottle indicates you do not have any refills left, you may request refills at any time through one of the following ways: The online [employer] system (except Urgent Care), by  calling your provider’s office, or by asking your pharmacy to contact your provider’s office with a refill request. Medication refills are processed only during regular business hours and may not be available until the next business day. Your provider may request additional information or to have a follow-up visit with you prior to refilling your medication.   *Please Note: Medication refills are assigned a new Rx number when refilled electronically. Your pharmacy may indicate that no refills were authorized even though a new prescription for the same medication is available at the pharmacy. Please request the medicine by name with the pharmacy before contacting your provider for a refill.        Instructions    A/P: 1) Mastitis: abx sent to pharmacy.           2) Continue breastfeeding.  D/w pt helps.            3) F/u regular PP visit.         Other Notes About Your Plan     MARLYN Quick Access Code: Activation code not generated  Current Abdelrahman Status: Active

## 2017-03-03 NOTE — TELEPHONE ENCOUNTER
Pt called андрей back line stating she would like to keep her 2 pm appt today. I asked Herminia CLAYTON to open appt slot for pt. Pt to come in today at 2:00 pm

## 2017-03-03 NOTE — TELEPHONE ENCOUNTER
"Pt called the triage line c/o having breast pain and fevers. I called Pt backs she stated that she has been feeling chills, body aches and fevers since she got home from the hospital. Pt states she stopped breastfeeding yesterday because she had cuts on her nipples and thought that her fevers were because of that, but before that she was able to feed baby. I asked pt how gerardo her temps have been, pt stated that she had not checked her temp but felt like she had a fever many times.Pt denied having red streaks down her breast or are warm to the touch.  I consulted with Sherie Ndiaye CNM she agreed to see patient today at 2 pm. I informed Pt that she could be seen today at 2 pm for mastitis evaluation. Pt stated that she could not come in today and wanted to know if she could be seen \"sometime next week\". I informed Pt that because she was c/o having fevers she needed to be seen as soon as possible. Pt stated that she could not come in today, I then informed pt that she should follow up at the ER if she continued to have fevers. Pt verbalized understanding and had no further questions.   "

## 2017-04-10 ENCOUNTER — POST PARTUM (OUTPATIENT)
Dept: OBGYN | Facility: CLINIC | Age: 22
End: 2017-04-10
Payer: MEDICAID

## 2017-04-10 VITALS — BODY MASS INDEX: 24.76 KG/M2 | DIASTOLIC BLOOD PRESSURE: 64 MMHG | SYSTOLIC BLOOD PRESSURE: 114 MMHG | WEIGHT: 131 LBS

## 2017-04-10 PROCEDURE — 90050 PR POSTPARTUM VISIT: CPT | Performed by: NURSE PRACTITIONER

## 2017-04-10 NOTE — PROGRESS NOTES
Pt here today for postpartum exam.  Delivery Date:2/21/17  Currently: bottle feeding   BCM: pt would like iud, [facility].   Wt: 131lb  BP: 114/64  Good ph:[phone]  Pt states thinks may still have mastitis

## 2017-04-10 NOTE — MR AVS SNAPSHOT
Shira Sanchez   4/10/2017 3:30 PM   Post Partum   MRN: [id number]    [facility]   Dept Phone:  [phone]    Description:  Female : 1995   Provider:  CIARAN Booker           Allergies as of 4/10/2017     Allergen Noted Reactions    Latex 2016   Rash    As a child during dental work.      You were diagnosed with     Postpartum examination following vaginal delivery   [367266]         Vital Signs     Blood Pressure Weight Last Menstrual Period Smoking Status          114/64 mmHg 59.421 kg (131 lb) 2016 (Exact Date) Never Smoker         Basic Information     Date Of Birth Sex Race Ethnicity Preferred Language    1995 Female White  Origin (Bulgarian,Citizen of [place] Islander,Zambian, etc) English      Your appointments     May 09, 2017  3:00 PM   GYN Visit with SHARONA MENG   [facility] [address] [id number] [phone]              Health Maintenance        Date Due Completion Dates    IMM HEP B VACCINE (1 of 3 - Primary Series) 1995 ---    IMM HEP A VACCINE (1 of 2 - Standard Series) 1998 ---    IMM HPV VACCINE (1 of 3 - Female 3 Dose Series) 2008 ---    IMM VARICELLA (CHICKENPOX) VACCINE (1 of 2 - 2 Dose Adolescent Series) 2010 ---    IMM MENINGOCOCCAL VACCINE (MCV4) (1 of 1) 2013 ---    IMM DTaP/Tdap/Td Vaccine (1 - Tdap) 2016 ---            Current Immunizations     No immunizations on file.      Below and/or attached are the medications your provider expects you to take. Review all of your home medications and newly ordered medications with your provider and/or pharmacist. Follow medication instructions as directed by your provider and/or pharmacist. Please keep your medication list with you and share with your provider. Update the information when medications are discontinued, doses are changed, or new medications (including over-the-counter products) are added; and carry medication  information at all times in the event of emergency situations     Allergies:  LATEX - Rash               Medications  Valid as of: April 10, 2017 -  3:49 PM    Generic Name Brand Name Tablet Size Instructions for use    Docusate Sodium (Cap)  MG Take 100 mg by mouth 2 times a day as needed for Constipation.        Ferrous Sulfate (Tab) ferrous sulfate 325 (65 FE) MG Take 1 Tab by mouth 3 times a day.        Ibuprofen (Tab) MOTRIN 600 MG Take 1 Tab by mouth every 6 hours as needed (For cramping after delivery; do not give if patient is receiving ketorolac (Toradol)).        Prenatal MV-Min-Fe Fum-FA-DHA   Take  by mouth.        .                 Medicines prescribed today were sent to:     [facility] #[id number] - [place], [place] - [address]    [address] [address]    Phone: [phone] Fax: [phone]    Open 24 Hours?: No    [id number] - [place], [place] - [address]    [address] [address]    Phone: [phone] Fax: [phone]    Open 24 Hours?: No      Medication refill instructions:       If your prescription bottle indicates you have medication refills left, it is not necessary to call your provider’s office. Please contact your pharmacy and they will refill your medication.    If your prescription bottle indicates you do not have any refills left, you may request refills at any time through one of the following ways: The online [employer] system (except Urgent Care), by calling your provider’s office, or by asking your pharmacy to contact your provider’s office with a refill request. Medication refills are processed only during regular business hours and may not be available until the next business day. Your provider may request additional information or to have a follow-up visit with you prior to refilling your medication.   *Please Note: Medication refills are assigned a new Rx number when refilled electronically. Your pharmacy may indicate that no  refills were authorized even though a new prescription for the same medication is available at the pharmacy. Please request the medicine by name with the pharmacy before contacting your provider for a refill.        Other Notes About Your Plan     MARLYN Quick Access Code: Activation code not generated  Current Abdelrahman Status: Active

## 2017-04-10 NOTE — PROGRESS NOTES
Subjective:      Shira Sanchez is a 22 y.o. female who presents with No chief complaint on file.  Here for postpartum exam.  Was severely anemic due to blood loss after delivery.  No transfusion.  Had cervical and sulcus repairs.  After she came home she had mastitis and quit breastfeeding.  Thinks she still may have some mastitis.  Yeast on nipples only, no mastitis.  Comfort measures given and recommend topical lotrimen cream BID for 2 weeks.    Wants IUD referred for IUD counseling here.  Diet/exercise reviewed.  Continue iron and vitamins.          HPI    ROS       Objective:     /64 mmHg  Wt 59.421 kg (131 lb)  LMP 05/27/2016 (Exact Date)     Physical Exam  Normal postpartum exam  Lungs clear to auscultation  HR RRR  Breasts no masses, non tender, yeast noted on nipples  Abdomen soft, no masses non tender  Perineum intact  Cervix closed no cervical motion tenderness.          Assessment/Plan:   Postpartum  Post delivery anemia  Desires IUD  There are no diagnoses linked to this encounter.

## 2017-11-02 ENCOUNTER — HOSPITAL ENCOUNTER (OUTPATIENT)
Facility: MEDICAL CENTER | Age: 22
End: 2017-11-02
Attending: NURSE PRACTITIONER
Payer: MEDICAID

## 2017-11-02 ENCOUNTER — GYNECOLOGY VISIT (OUTPATIENT)
Dept: OBGYN | Facility: CLINIC | Age: 22
End: 2017-11-02
Payer: MEDICAID

## 2017-11-02 VITALS
DIASTOLIC BLOOD PRESSURE: 58 MMHG | HEIGHT: 61 IN | BODY MASS INDEX: 23.41 KG/M2 | WEIGHT: 124 LBS | SYSTOLIC BLOOD PRESSURE: 98 MMHG

## 2017-11-02 DIAGNOSIS — N81.4 CYSTOCELE WITH UTERINE PROLAPSE: ICD-10-CM

## 2017-11-02 DIAGNOSIS — R32 INCONTINENCE OF URINE IN FEMALE: ICD-10-CM

## 2017-11-02 DIAGNOSIS — N94.10 DYSPAREUNIA IN FEMALE: ICD-10-CM

## 2017-11-02 DIAGNOSIS — N76.0 BV (BACTERIAL VAGINOSIS): ICD-10-CM

## 2017-11-02 DIAGNOSIS — B96.89 BV (BACTERIAL VAGINOSIS): ICD-10-CM

## 2017-11-02 DIAGNOSIS — N81.4 UTERINE PROLAPSE: ICD-10-CM

## 2017-11-02 DIAGNOSIS — R39.15 URINARY URGENCY: ICD-10-CM

## 2017-11-02 DIAGNOSIS — R30.0 DYSURIA: ICD-10-CM

## 2017-11-02 LAB
APPEARANCE UR: NORMAL
BILIRUB UR STRIP-MCNC: NORMAL MG/DL
C TRACH DNA SPEC QL NAA+PROBE: NEGATIVE
COLOR UR AUTO: NORMAL
GLUCOSE UR STRIP.AUTO-MCNC: NEGATIVE MG/DL
KETONES UR STRIP.AUTO-MCNC: NEGATIVE MG/DL
LEUKOCYTE ESTERASE UR QL STRIP.AUTO: NEGATIVE
N GONORRHOEA DNA SPEC QL NAA+PROBE: NEGATIVE
NITRITE UR QL STRIP.AUTO: NEGATIVE
PH UR STRIP.AUTO: 6.5 [PH] (ref 5–8)
PROT UR QL STRIP: NEGATIVE MG/DL
RBC UR QL AUTO: NEGATIVE
SP GR UR STRIP.AUTO: 1.01
SPECIMEN SOURCE: NORMAL
UROBILINOGEN UR STRIP-MCNC: NORMAL MG/DL

## 2017-11-02 PROCEDURE — 87591 N.GONORRHOEAE DNA AMP PROB: CPT

## 2017-11-02 PROCEDURE — 87491 CHLMYD TRACH DNA AMP PROBE: CPT | Mod: 91

## 2017-11-02 PROCEDURE — 87077 CULTURE AEROBIC IDENTIFY: CPT

## 2017-11-02 PROCEDURE — 81002 URINALYSIS NONAUTO W/O SCOPE: CPT | Performed by: NURSE PRACTITIONER

## 2017-11-02 PROCEDURE — 87591 N.GONORRHOEAE DNA AMP PROB: CPT | Mod: 91

## 2017-11-02 PROCEDURE — 87086 URINE CULTURE/COLONY COUNT: CPT

## 2017-11-02 PROCEDURE — 87491 CHLMYD TRACH DNA AMP PROBE: CPT

## 2017-11-02 PROCEDURE — 99204 OFFICE O/P NEW MOD 45 MIN: CPT | Performed by: NURSE PRACTITIONER

## 2017-11-02 RX ORDER — CLINDAMYCIN PHOSPHATE 20 MG/G
CREAM VAGINAL
Qty: 1 TUBE | Refills: 0 | Status: SHIPPED | OUTPATIENT
Start: 2017-11-02

## 2017-11-02 ASSESSMENT — ENCOUNTER SYMPTOMS
ABDOMINAL PAIN: 1
EYES NEGATIVE: 1
PSYCHIATRIC NEGATIVE: 1
NEUROLOGICAL NEGATIVE: 1
DEPRESSION: 0
MUSCULOSKELETAL NEGATIVE: 1
CONSTITUTIONAL NEGATIVE: 1
RESPIRATORY NEGATIVE: 1
CARDIOVASCULAR NEGATIVE: 1
FLANK PAIN: 0

## 2017-11-02 NOTE — NON-PROVIDER
Pt here for consult on having issues while urinating   # [phone]  Pt states would like discuss all issues going on.  Currently using condoms for contraception.

## 2017-11-02 NOTE — PROGRESS NOTES
"Subjective:      CC:   Shira Sanchez is a 22 y.o. female who presents with Gynecologic Exam (consult on frequency on urination and pain. )    Shira presents today for urinary incontinence, dribbles even with out cough sneeze, chronic frequency, urgency and incomplete emptying. Also c/o vaginal pain, painful IC (before baby no sex since Delivery 2017), chronic vaginitis and odor  Her last menstrual period was Patient's last menstrual period was 10/15/2017., pt states periods are regular at this time. Pt has menstrual cramping: no.  Pt has a history of  ,  19, but has had these bladder and vaginal s/s for at least 6 months before pregnancy.    Pt also has pelvic discomfort from time to time and is related to urination.  Abstinence for BCM right now    Gynecologic Exam   Associated symptoms include abdominal pain, dysuria, frequency and urgency. Pertinent negatives include no flank pain or hematuria.       Review of Systems   Constitutional: Negative.    HENT: Negative.    Eyes: Negative.    Respiratory: Negative.    Cardiovascular: Negative.    Gastrointestinal: Positive for abdominal pain.   Genitourinary: Positive for dysuria, frequency and urgency. Negative for flank pain and hematuria.   Musculoskeletal: Negative.    Skin: Negative.    Neurological: Negative.    Endo/Heme/Allergies: Negative.    Psychiatric/Behavioral: Negative.  Negative for depression and suicidal ideas.          Objective:     BP (!) 98/58   Ht 1.549 m (5' 1\")   Wt 56.2 kg (124 lb)   LMP 10/15/2017   Breastfeeding? No   BMI 23.43 kg/m²      Physical Exam   Constitutional: She is oriented to person, place, and time. She appears well-developed and well-nourished.   HENT:   Head: Normocephalic.   Eyes: Conjunctivae are normal.   Abdominal: Soft. Bowel sounds are normal. There is no tenderness. A hernia is present. Hernia confirmed negative in the right inguinal area and confirmed negative in the left inguinal area. "   Genitourinary: Pelvic exam was performed with patient supine. No labial fusion. There is no rash, tenderness, lesion or injury on the right labia. There is no rash, tenderness, lesion or injury on the left labia. Uterus is not deviated, not enlarged, not fixed and not tender. Cervix exhibits no motion tenderness. Right adnexum displays no mass, no tenderness and no fullness. Left adnexum displays no mass, no tenderness and no fullness. There is tenderness in the vagina. No erythema or bleeding in the vagina. No foreign body in the vagina. Vaginal discharge (thin white adherent with odor, wet prep prepared and  read by me + CC + whiff ) found.   Genitourinary Comments: Uterine prolapse and bladder   Grade 2 cystocele   Musculoskeletal: Normal range of motion.   Lymphadenopathy:        Right: No inguinal adenopathy present.        Left: No inguinal adenopathy present.   Neurological: She is alert and oriented to person, place, and time. She has normal reflexes.   Skin: Skin is warm and dry.   Psychiatric: She has a normal mood and affect. Her behavior is normal. Judgment and thought content normal.   Nursing note and vitals reviewed.    Clear urine dip today  Here in office          Assessment/Plan:     1. Dysuria    - POCT Urinalysis  - CHLAMYDIA/GC PCR URINE OR SWAB; Future  - URINE CULTURE; Future  - REFERRAL TO GYNECOLOGY  - REFERRAL TO UROLOGY    2. Incontinence of urine in female    - POCT Urinalysis  - CHLAMYDIA/GC PCR URINE OR SWAB; Future  - URINE CULTURE; Future  - REFERRAL TO GYNECOLOGY  - REFERRAL TO UROLOGY    3. Dyspareunia in female  Likely due to prolapse and chronic BV s/s that patient also has       4. Urinary urgency  - POCT Urinalysis  - CHLAMYDIA/GC PCR URINE OR SWAB; Future  - URINE CULTURE; Future  - REFERRAL TO GYNECOLOGY  - REFERRAL TO UROLOGY    5. Uterine prolapse  Referral to MD GYN and UROLOGY     6. Cystocele with uterine prolapse  Referral to MD GYN and urology    7. BV (bacterial  vaginosis)    - clindamycin (CLEOCIN) 2 % vaginal cream; Place 1 applicatorful in the vagina each night at bedtime for 7 nights  Dispense: 1 Tube; Refill: 0    RTC to see MD for eval and I will call with urine culture results and plan if there is any infection to be noted.

## 2017-11-03 LAB
C TRACH DNA SPEC QL NAA+PROBE: NEGATIVE
N GONORRHOEA DNA SPEC QL NAA+PROBE: NEGATIVE
SPECIMEN SOURCE: NORMAL

## 2017-11-04 LAB
BACTERIA UR CULT: ABNORMAL
BACTERIA UR CULT: ABNORMAL
SIGNIFICANT IND 70042: ABNORMAL
SOURCE SOURCE: ABNORMAL

## 2017-11-14 ENCOUNTER — TELEPHONE (OUTPATIENT)
Dept: OBGYN | Facility: CLINIC | Age: 22
End: 2017-11-14

## 2017-11-14 NOTE — TELEPHONE ENCOUNTER
Pt called triage line wanting to know test results that were ordered by Nuria SHELDON and update on referrals. Called pt to inform her that her test results were all within normal limits, gave pt number to to Urology [facility] to make appt. Because this is where her referral was sent also informed pt that we will work on getting a referral for her to see one of our Gynocologist since she has [facility], and we will call her back with more information, per consult with Sherie DIAZ. Pt verbalized understanding and will comply pt had no further questions or concerns.

## 2017-11-27 ENCOUNTER — HOSPITAL ENCOUNTER (OUTPATIENT)
Facility: MEDICAL CENTER | Age: 22
End: 2017-11-27
Attending: OBSTETRICS & GYNECOLOGY
Payer: MEDICAID

## 2017-11-27 ENCOUNTER — GYNECOLOGY VISIT (OUTPATIENT)
Dept: OBGYN | Facility: CLINIC | Age: 22
End: 2017-11-27
Payer: MEDICAID

## 2017-11-27 VITALS — WEIGHT: 123 LBS | DIASTOLIC BLOOD PRESSURE: 64 MMHG | SYSTOLIC BLOOD PRESSURE: 114 MMHG | BODY MASS INDEX: 23.24 KG/M2

## 2017-11-27 DIAGNOSIS — R39.15 URINARY URGENCY: ICD-10-CM

## 2017-11-27 DIAGNOSIS — R10.2 PELVIC PRESSURE IN FEMALE: ICD-10-CM

## 2017-11-27 DIAGNOSIS — N89.8 VAGINAL DISCHARGE: ICD-10-CM

## 2017-11-27 PROCEDURE — 87510 GARDNER VAG DNA DIR PROBE: CPT

## 2017-11-27 PROCEDURE — 87480 CANDIDA DNA DIR PROBE: CPT

## 2017-11-27 PROCEDURE — 87660 TRICHOMONAS VAGIN DIR PROBE: CPT

## 2017-11-27 PROCEDURE — 99214 OFFICE O/P EST MOD 30 MIN: CPT | Performed by: OBSTETRICS & GYNECOLOGY

## 2017-11-27 NOTE — PROGRESS NOTES
"S: Shira presents for evaluation of possible prolapse.  She complains of a strong urge to urinate, but then is unable to urinate.  Denies dysuria or frequency.  She states she has had these symptoms since she was [age] years old.  She leaks urine randomly, small amounts, and changes a pad 2 times per day.  Nocturia x 1 each night.  No bowel complaints.  Some pain with intercourse at times.  Also complains of \"chronic BV\", although no discharge present today.    O:Blood pressure 114/64, weight 55.8 kg (123 lb), not currently breastfeeding.    GENERAL: Alert, in no apparent distress  PSYCHIATRIC: Appropriate affect, intact insight and judgement.  NECK:  Nontender, no masses.  No Thyromegaly or nodules. No lymphadenopathy.  ABDOMEN: Soft, nontender, nondistended.  No palpable masses.  No rebound or guarding.  No inguinal lymphadenopathy.  No hepatosplenomegaly.  No hernias.  BACK: No CVA tenderness  EXTREMITIES: No edema  SKIN: No rash    GENITOURINARY:  Normal external genitalia, no lesions.  Normal urethral meatus, no masses or tenderness.  Normal bladder without fullness or masses. No cystocele is present.   Vagina well estrogenized, no vaginal discharge or lesions.  Cervix without lesions or discharge, nontender. Cervix is mobile, no descent is noted with valsalva.  Uterus normal size, shape, and contour, nontender.  Adnexa nontender, no masses.  Normal anus and perineum.    Rectal Exam - not indicated.    GC negative.  Urine culture - 10 K group B strep    A/P:  1. Urinary urgency - Pt has appointment with urology on 11/29.  I stated that I would usually treat bacteria in urine, even if less than 100K if pt is symptomatic, but as pt has appointment with urology in two days, will defer to their management.  Also discussed the urgency may be due to overactive bladder/detrusor instability, which may respond to medication, but given her symptoms of inability to void times, recommend cystometrics and check of post " void residual.   2. Chronic BV per patient report.  Exam normal today.  Vaginal pathogens obtained.  Will call if treatment is necessary.  3. No evidence of pelvic organ prolapse.    F/U if BV symptoms recur.

## 2017-11-27 NOTE — NON-PROVIDER
Pt here to discuss possible uterine prolapse.  States thinks her BV came back. Also still having urgency and pressure to urinate all the time.   Good # [phone]

## 2017-11-28 DIAGNOSIS — N89.8 VAGINAL DISCHARGE: ICD-10-CM

## 2017-11-28 LAB
CANDIDA DNA VAG QL PROBE+SIG AMP: NEGATIVE
G VAGINALIS DNA VAG QL PROBE+SIG AMP: NEGATIVE
T VAGINALIS DNA VAG QL PROBE+SIG AMP: NEGATIVE

## 2020-05-18 ENCOUNTER — HOSPITAL ENCOUNTER (OUTPATIENT)
Facility: MEDICAL CENTER | Age: 25
End: 2020-05-18
Attending: PHYSICIAN ASSISTANT
Payer: COMMERCIAL

## 2020-05-18 PROCEDURE — 87624 HPV HI-RISK TYP POOLED RSLT: CPT

## 2020-05-18 PROCEDURE — 88175 CYTOPATH C/V AUTO FLUID REDO: CPT

## 2020-05-19 LAB
CYTOLOGY REG CYTOL: ABNORMAL
HPV HR 12 DNA CVX QL NAA+PROBE: POSITIVE
HPV16 DNA SPEC QL NAA+PROBE: NEGATIVE
HPV18 DNA SPEC QL NAA+PROBE: NEGATIVE
SPECIMEN SOURCE: ABNORMAL

## 2021-02-19 ENCOUNTER — HOSPITAL ENCOUNTER (OUTPATIENT)
Dept: LAB | Facility: MEDICAL CENTER | Age: 26
End: 2021-02-19
Attending: PHYSICIAN ASSISTANT
Payer: COMMERCIAL

## 2021-02-19 PROCEDURE — 87077 CULTURE AEROBIC IDENTIFY: CPT

## 2021-02-19 PROCEDURE — 87086 URINE CULTURE/COLONY COUNT: CPT

## 2021-02-19 PROCEDURE — 87186 SC STD MICRODIL/AGAR DIL: CPT

## 2021-02-19 PROCEDURE — 81001 URINALYSIS AUTO W/SCOPE: CPT

## 2021-02-19 PROCEDURE — 87491 CHLMYD TRACH DNA AMP PROBE: CPT

## 2021-02-19 PROCEDURE — 87591 N.GONORRHOEAE DNA AMP PROB: CPT

## 2021-02-20 LAB
APPEARANCE UR: CLEAR
BACTERIA #/AREA URNS HPF: NEGATIVE /HPF
BILIRUB UR QL STRIP.AUTO: NEGATIVE
C TRACH DNA SPEC QL NAA+PROBE: NEGATIVE
COLOR UR: YELLOW
EPI CELLS #/AREA URNS HPF: NEGATIVE /HPF
GLUCOSE UR STRIP.AUTO-MCNC: NEGATIVE MG/DL
HYALINE CASTS #/AREA URNS LPF: ABNORMAL /LPF
KETONES UR STRIP.AUTO-MCNC: NEGATIVE MG/DL
LEUKOCYTE ESTERASE UR QL STRIP.AUTO: ABNORMAL
MICRO URNS: ABNORMAL
N GONORRHOEA DNA SPEC QL NAA+PROBE: NEGATIVE
NITRITE UR QL STRIP.AUTO: NEGATIVE
PH UR STRIP.AUTO: 6.5 [PH] (ref 5–8)
PROT UR QL STRIP: NEGATIVE MG/DL
RBC # URNS HPF: ABNORMAL /HPF
RBC UR QL AUTO: NEGATIVE
SP GR UR STRIP.AUTO: <=1.005
SPECIMEN SOURCE: NORMAL
UROBILINOGEN UR STRIP.AUTO-MCNC: 0.2 MG/DL
WBC #/AREA URNS HPF: ABNORMAL /HPF

## 2021-02-22 LAB
BACTERIA UR CULT: ABNORMAL
BACTERIA UR CULT: ABNORMAL
SIGNIFICANT IND 70042: ABNORMAL
SITE SITE: ABNORMAL
SOURCE SOURCE: ABNORMAL

## 2022-05-18 ENCOUNTER — APPOINTMENT (OUTPATIENT)
Dept: RADIOLOGY | Facility: MEDICAL CENTER | Age: 27
End: 2022-05-18
Attending: EMERGENCY MEDICINE
Payer: COMMERCIAL

## 2022-05-18 ENCOUNTER — HOSPITAL ENCOUNTER (EMERGENCY)
Facility: MEDICAL CENTER | Age: 27
End: 2022-05-18
Attending: EMERGENCY MEDICINE
Payer: COMMERCIAL

## 2022-05-18 VITALS
WEIGHT: 156 LBS | BODY MASS INDEX: 29.45 KG/M2 | DIASTOLIC BLOOD PRESSURE: 70 MMHG | SYSTOLIC BLOOD PRESSURE: 112 MMHG | TEMPERATURE: 98.2 F | OXYGEN SATURATION: 98 % | HEIGHT: 61 IN | RESPIRATION RATE: 18 BRPM | HEART RATE: 94 BPM

## 2022-05-18 DIAGNOSIS — S81.811A LACERATION OF RIGHT LOWER EXTREMITY, INITIAL ENCOUNTER: ICD-10-CM

## 2022-05-18 PROCEDURE — 73610 X-RAY EXAM OF ANKLE: CPT | Mod: RT

## 2022-05-18 PROCEDURE — 700102 HCHG RX REV CODE 250 W/ 637 OVERRIDE(OP): Performed by: EMERGENCY MEDICINE

## 2022-05-18 PROCEDURE — 304217 HCHG IRRIGATION SYSTEM: Mod: EDC

## 2022-05-18 PROCEDURE — 700111 HCHG RX REV CODE 636 W/ 250 OVERRIDE (IP): Performed by: EMERGENCY MEDICINE

## 2022-05-18 PROCEDURE — 90471 IMMUNIZATION ADMIN: CPT | Mod: EDC

## 2022-05-18 PROCEDURE — 303747 HCHG EXTRA SUTURE: Mod: EDC

## 2022-05-18 PROCEDURE — 99284 EMERGENCY DEPT VISIT MOD MDM: CPT | Mod: EDC

## 2022-05-18 PROCEDURE — 73564 X-RAY EXAM KNEE 4 OR MORE: CPT | Mod: RT

## 2022-05-18 PROCEDURE — 304999 HCHG REPAIR-SIMPLE/INTERMED LEVEL 1: Mod: EDC

## 2022-05-18 PROCEDURE — 700101 HCHG RX REV CODE 250: Performed by: EMERGENCY MEDICINE

## 2022-05-18 PROCEDURE — A9270 NON-COVERED ITEM OR SERVICE: HCPCS | Performed by: EMERGENCY MEDICINE

## 2022-05-18 PROCEDURE — 90715 TDAP VACCINE 7 YRS/> IM: CPT | Performed by: EMERGENCY MEDICINE

## 2022-05-18 RX ORDER — CEPHALEXIN 500 MG/1
1000 CAPSULE ORAL 2 TIMES DAILY
Qty: 20 CAPSULE | Refills: 0 | Status: SHIPPED | OUTPATIENT
Start: 2022-05-18 | End: 2022-05-22 | Stop reason: SDUPTHER

## 2022-05-18 RX ORDER — CEPHALEXIN 500 MG/1
1000 CAPSULE ORAL ONCE
Status: COMPLETED | OUTPATIENT
Start: 2022-05-18 | End: 2022-05-18

## 2022-05-18 RX ORDER — LIDOCAINE HCL/EPINEPHRINE/PF 2%-1:200K
20 VIAL (ML) INJECTION ONCE
Status: COMPLETED | OUTPATIENT
Start: 2022-05-18 | End: 2022-05-18

## 2022-05-18 RX ADMIN — CEPHALEXIN 1000 MG: 500 CAPSULE ORAL at 22:03

## 2022-05-18 RX ADMIN — CLOSTRIDIUM TETANI TOXOID ANTIGEN (FORMALDEHYDE INACTIVATED), CORYNEBACTERIUM DIPHTHERIAE TOXOID ANTIGEN (FORMALDEHYDE INACTIVATED), BORDETELLA PERTUSSIS TOXOID ANTIGEN (GLUTARALDEHYDE INACTIVATED), BORDETELLA PERTUSSIS FILAMENTOUS HEMAGGLUTININ ANTIGEN (FORMALDEHYDE INACTIVATED), BORDETELLA PERTUSSIS PERTACTIN ANTIGEN, AND BORDETELLA PERTUSSIS FIMBRIAE 2/3 ANTIGEN 0.5 ML: 5; 2; 2.5; 5; 3; 5 INJECTION, SUSPENSION INTRAMUSCULAR at 22:04

## 2022-05-18 RX ADMIN — LIDOCAINE HYDROCHLORIDE AND EPINEPHRINE 7 ML: 20; 5 INJECTION, SOLUTION EPIDURAL; INFILTRATION; INTRACAUDAL; PERINEURAL at 20:15

## 2022-05-19 NOTE — ED NOTES
Pt in NAD at this time, skin PWD -RLE evulsion 3cm on R-Knee. Patient denies LOC. Is alert and oriented for age.

## 2022-05-19 NOTE — ED NOTES
Knee immobilizer applied to pts right knee per ERP request. Education on proper use and application given. Pt verbalized understanding. No further questions at this time.

## 2022-05-19 NOTE — DISCHARGE INSTRUCTIONS
Return for redness swelling or discharge of the wound.  Please utilize a knee immobilizer to prevent disruption of the wound sutures.
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
## 2022-05-19 NOTE — ED NOTES
"[facility]    Discharge instructions, which include signs and symptoms to monitor patient for, as well as detailed information regarding laceration with suture care provided.  All questions and concerns addressed at this time.      Follow up visit with [facility] encouraged for suture removal in 2wk's office contact information with phone number and address provided.     Prescription for KEFLEX provided to patient. Mother educated on course and importance of completing entire course of medication    Patient leaves ER in no apparent distress. This RN provided education regarding returning to the ER for any new concerns or changes in patient's condition.      /70   Pulse 94   Temp 36.8 °C (98.2 °F) (Temporal)   Resp 18   Ht 1.549 m (5' 1\")   Wt 70.8 kg (156 lb)   LMP 05/11/2022 (Approximate)   SpO2 98%   BMI 29.48 kg/m²     "

## 2022-05-22 ENCOUNTER — HOSPITAL ENCOUNTER (EMERGENCY)
Facility: MEDICAL CENTER | Age: 27
End: 2022-05-22
Attending: EMERGENCY MEDICINE
Payer: COMMERCIAL

## 2022-05-22 VITALS
HEART RATE: 95 BPM | HEIGHT: 61 IN | TEMPERATURE: 98.6 F | SYSTOLIC BLOOD PRESSURE: 105 MMHG | RESPIRATION RATE: 14 BRPM | WEIGHT: 156.31 LBS | DIASTOLIC BLOOD PRESSURE: 72 MMHG | OXYGEN SATURATION: 97 % | BODY MASS INDEX: 29.51 KG/M2

## 2022-05-22 DIAGNOSIS — Z51.89 ENCOUNTER FOR WOUND RE-CHECK: ICD-10-CM

## 2022-05-22 DIAGNOSIS — S81.811A LACERATION OF RIGHT LOWER EXTREMITY, INITIAL ENCOUNTER: ICD-10-CM

## 2022-05-22 DIAGNOSIS — L03.115 CELLULITIS OF RIGHT LOWER EXTREMITY: ICD-10-CM

## 2022-05-22 PROCEDURE — 99282 EMERGENCY DEPT VISIT SF MDM: CPT

## 2022-05-22 RX ORDER — CEPHALEXIN 500 MG/1
1000 CAPSULE ORAL 2 TIMES DAILY
Qty: 28 CAPSULE | Refills: 0 | Status: SHIPPED | OUTPATIENT
Start: 2022-05-22 | End: 2022-05-22

## 2022-05-22 RX ORDER — CLINDAMYCIN HYDROCHLORIDE 150 MG/1
300 CAPSULE ORAL 3 TIMES DAILY
Qty: 42 CAPSULE | Refills: 0 | Status: SHIPPED | OUTPATIENT
Start: 2022-05-22 | End: 2022-05-29

## 2022-05-22 RX ORDER — CEPHALEXIN 500 MG/1
500 CAPSULE ORAL 4 TIMES DAILY
Qty: 40 CAPSULE | Refills: 0 | Status: SHIPPED | OUTPATIENT
Start: 2022-05-22 | End: 2022-05-22
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
## 2022-05-22 NOTE — ED NOTES
Provided pt with discharge instructions. Prescription meds were sent electronically to pt preferred pharmacy per ERP. Pt understood all discharge instructions and had no further questions.

## 2022-06-13 ENCOUNTER — PHYSICAL THERAPY (OUTPATIENT)
Dept: PHYSICAL THERAPY | Facility: MEDICAL CENTER | Age: 27
End: 2022-06-13
Attending: NURSE PRACTITIONER
Payer: COMMERCIAL

## 2022-06-13 DIAGNOSIS — M25.561 RIGHT KNEE PAIN, UNSPECIFIED CHRONICITY: ICD-10-CM

## 2022-06-13 DIAGNOSIS — S93.401D SPRAIN OF UNSPECIFIED LIGAMENT OF RIGHT ANKLE, SUBSEQUENT ENCOUNTER: ICD-10-CM

## 2022-06-13 DIAGNOSIS — Z09 ENCOUNTER FOR FOLLOW-UP EXAMINATION AFTER COMPLETED TREATMENT FOR CONDITIONS OTHER THAN MALIGNANT NEOPLASM: ICD-10-CM

## 2022-06-13 PROCEDURE — 97110 THERAPEUTIC EXERCISES: CPT

## 2022-06-13 PROCEDURE — 97162 PT EVAL MOD COMPLEX 30 MIN: CPT

## 2022-06-13 ASSESSMENT — ENCOUNTER SYMPTOMS
PAIN SCALE AT HIGHEST: 2
PAIN SCALE AT LOWEST: 0
PAIN SCALE: 0
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
## 2022-06-21 ENCOUNTER — PHYSICAL THERAPY (OUTPATIENT)
Dept: PHYSICAL THERAPY | Facility: MEDICAL CENTER | Age: 27
End: 2022-06-21
Attending: NURSE PRACTITIONER
Payer: COMMERCIAL

## 2022-06-21 DIAGNOSIS — S93.401D SPRAIN OF UNSPECIFIED LIGAMENT OF RIGHT ANKLE, SUBSEQUENT ENCOUNTER: ICD-10-CM

## 2022-06-21 DIAGNOSIS — M25.561 RIGHT KNEE PAIN, UNSPECIFIED CHRONICITY: ICD-10-CM

## 2022-06-21 DIAGNOSIS — Z09 ENCOUNTER FOR FOLLOW-UP EXAMINATION AFTER COMPLETED TREATMENT FOR CONDITIONS OTHER THAN MALIGNANT NEOPLASM: ICD-10-CM

## 2022-06-21 PROCEDURE — 97014 ELECTRIC STIMULATION THERAPY: CPT

## 2022-06-21 PROCEDURE — 97140 MANUAL THERAPY 1/> REGIONS: CPT

## 2022-06-21 PROCEDURE — 97110 THERAPEUTIC EXERCISES: CPT
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
## 2022-06-28 ENCOUNTER — PHYSICAL THERAPY (OUTPATIENT)
Dept: PHYSICAL THERAPY | Facility: MEDICAL CENTER | Age: 27
End: 2022-06-28
Attending: NURSE PRACTITIONER
Payer: COMMERCIAL

## 2022-06-28 DIAGNOSIS — M25.561 RIGHT KNEE PAIN, UNSPECIFIED CHRONICITY: ICD-10-CM

## 2022-06-28 PROCEDURE — 97110 THERAPEUTIC EXERCISES: CPT

## 2022-06-28 NOTE — OP THERAPY DAILY TREATMENT
Outpatient Physical Therapy  DAILY TREATMENT     [facility] Outpatient Physical Therapy  [address]  [place] [address]  Phone:  [phone]  Fax:  [phone]    Date: 06/28/2022    Patient: Shira Sanchez  YOB: 1995  MRN: [id number]     Time Calculation    Start time: 1445  Stop time: 1535 Time Calculation (min): 50 minutes         Chief Complaint: Knee Problem    Visit #: 3    SUBJECTIVE:  Pt reports that her ankle is still swollen; but it is feeling better to walk on it. Pt reports that her knee is feeling better as well and the scab is heeling well; tolerance to bending her knee has improved. Notes most of the pain is in her knee when she lays in bed on her stomach and ascends stairs.     OBJECTIVE:  Current objective measures:       From eval: 87 deg R knee flexion AROM  Knee ROM today:   140 deg R knee flexion AROM while in hooklying     Ankle ROM DF knee to wall:  R: 2.5 in from wall  L: 4.5 in from wall     Hypomobile R ankle      Improved appearance of wound; no longer has significant scabbing. Wound is closed and allowing pt to flex knee without increased pain over scar.    Therapeutic Exercises (CPT 10106):     1. Pt education, re: WB on ankle for proprioception; edu re: massage healing around incision     2. Pt education, rocking fwd/bwd at counter , hep    3. Ankle alphabet in sitting, verbal review    4. Pt education, re: wound care including: importance of moist vs dry and wet wound care; edu to add moisturizing base and cover with vaseline/aquaphor with bandage; instructions to change once per day to prevent extensive temp loss     5. Nustepper, x5 mis, seat height lvl 4; cardiovascular warm up    6. TRX squats/ squats in front of mirror, x15 ea, pt required mod cueing to perform proper hip hinge technique; pt notes less pain when perform    7. Seated ankle heel raises+kettlebell, 2x10 15#, NT    8. Bridge marching, 2x5 ea leg    9. Step  downs, 6 in 2x10, reintroduced today; pt tolerated these well as knee pain is less symptomatci today; pt still demonstrated genu valgum but is able to correct with visual/verbal cueing    10. Clamshelportillo, 2x10 ea orange TB, visible muscle juddering R>L thigh    11. SL balance at bar, 4x20 seconds , increased muscle juddering R>L; pt noted increased fatigue in R quad at end of repetition      Therapeutic Exercise Summary: Pt performed these exercises with instruction and SPV.  Provided handout with these exercises for daily HEP.      Therapeutic Treatments and Modalities:     1. Manual Therapy (CPT 79487), See below , 8 mins    2. E Stim Unattended (CPT 85347), IFC to R knee with mhp x 15 min     Therapeutic Treatment and Modalities Summary: Manual:  R Ankle posterior mobs gr III      Time-based treatments/modalities:      ASSESSMENT:   Response to treatment: Pt showed significant improvement today with exercise tolerance as pain levels have decreased. Ankle DF AROM has visually improved; will reassess next session with knee to wall test. Plan to assess stair climbing next session. Incorporate lateral walking with band to increase glute med strength to allow pt to avoid genu valgum with exercise.     PLAN/RECOMMENDATIONS:   Plan for treatment: therapy treatment to continue next visit.  Planned interventions for next visit: continue with current treatment. Stairs, airex balance, lateral walking      [x] As the licensed therapist supervising this student, I was present during the entire treatment session directing the care and reviewing the assessment plan.  I reviewed all documentation prior to signing.

## 2022-07-15 ENCOUNTER — PHYSICAL THERAPY (OUTPATIENT)
Dept: PHYSICAL THERAPY | Facility: MEDICAL CENTER | Age: 27
End: 2022-07-15
Attending: NURSE PRACTITIONER
Payer: COMMERCIAL

## 2022-07-15 DIAGNOSIS — S93.401D SPRAIN OF UNSPECIFIED LIGAMENT OF RIGHT ANKLE, SUBSEQUENT ENCOUNTER: ICD-10-CM

## 2022-07-15 DIAGNOSIS — Z09 ENCOUNTER FOR FOLLOW-UP EXAMINATION AFTER COMPLETED TREATMENT FOR CONDITIONS OTHER THAN MALIGNANT NEOPLASM: ICD-10-CM

## 2022-07-15 DIAGNOSIS — M25.561 RIGHT KNEE PAIN, UNSPECIFIED CHRONICITY: ICD-10-CM

## 2022-07-15 PROCEDURE — 97110 THERAPEUTIC EXERCISES: CPT

## 2022-07-15 NOTE — OP THERAPY DAILY TREATMENT
Outpatient Physical Therapy  DAILY TREATMENT     [facility] Outpatient Physical Therapy  [address]  [place] [address]  Phone:  [phone]  Fax:  [phone]    Date: 07/15/2022    Patient: Shira Sanchez  YOB: 1995  MRN: [id number]     Time Calculation    Start time: 1402  Stop time: 1433 Time Calculation (min): 31 minutes         Chief Complaint: Knee Problem    Visit #: 4    SUBJECTIVE:  Pt reporting no significant difficulties with her ADL's. Did go on a car ride recently to [place] for a few hours and was experiencing some pain. Otherwise no increase in pain and wound is completely healed.      OBJECTIVE:  Current objective measures:       From eval: 87 deg R knee flexion AROM  Knee ROM today:   139 deg R knee flexion AROM while in hooklying  0 deg knee extension in lying     Quad test-Ely's:  L: 3.5 in from buttock  R: 2.5 in from buttock      Ankle ROM DF knee to wall:  R: 4 in from wall  L: 4.5 in from wall     Hypomobile R ankle      Improved appearance of wound; no longer has significant scabbing. Wound is closed and allowing pt to flex knee without increased pain over scar.    Therapeutic Exercises (CPT 16558):     1. Pt education, re: WB on ankle for proprioception; edu re: massage healing around incision     3. Ankle alphabet in sitting, NT    4. Pt education, re: wound care including: importance of moist vs dry and wet wound care; edu to add moisturizing base and cover with vaseline/aquaphor with bandage; instructions to change once per day to prevent extensive temp loss     5. Upright bike , x5 mis, cardiovascular warm up    6. TRX squats/ squats in front of mirror, x15 ea, NT    7. Seated ankle heel raises+kettlebell, 2x10 15#, NT    8. Bridge marching, 2x5 ea leg    9. Step downs, 6 in 2x10, NT    10. Clamshells, 2x10 ea orange TB, NT    11. SL balance at bar, 4x20 seconds , Verbal review     12. Air-ex balance, x1 min    13. Mini squats  on BOSU blue then black top, x10 ea, good stability; only one occasion UE hold with blue on top; , pt reporting slight pressure with BOSU squats but minimal; resolves when return to rest    15. 1/2 lunges in tandem on BOSU with black top, x10 , good balance without UE support needed      Therapeutic Exercise Summary: Pt performed these exercises with instruction and SPV.  Provided handout with these exercises for daily HEP.      Therapeutic Treatments and Modalities:     2. E Stim Unattended (CPT 58612), IFC to R knee with mhp x 15 min , declined today     Therapeutic Treatment and Modalities Summary:       Time-based treatments/modalities:    Pain pre treatment: 0/10  Aggravating: difficulty driving due to weakness, navigating stairs (step to pattern), kneeling-ankle and knee pain, prolonged walking x10 min       ASSESSMENT:   Response to treatment:   Pt demonstrating WFL knee ROM R with sig improvement in ankle DF ROM near LLE. Balance good with ability to progress to BOSU challenges. Good overall progression with no sig impact on ADL's reported; will follow up in 2-3 weeks to assess if further PT is needed.      PLAN/RECOMMENDATIONS:   Plan for treatment: therapy treatment to continue next visit.  Planned interventions for next visit: continue with current treatment.   Follow up in 2-3 weeks; potential DC at next session unless ADL limitations reported

## 2022-07-27 ENCOUNTER — APPOINTMENT (OUTPATIENT)
Dept: PHYSICAL THERAPY | Facility: MEDICAL CENTER | Age: 27
End: 2022-07-27
Attending: NURSE PRACTITIONER
Payer: COMMERCIAL

## 2022-08-02 ENCOUNTER — APPOINTMENT (OUTPATIENT)
Dept: PHYSICAL THERAPY | Facility: MEDICAL CENTER | Age: 27
End: 2022-08-02
Attending: NURSE PRACTITIONER
Payer: COMMERCIAL

## 2022-08-04 ENCOUNTER — APPOINTMENT (OUTPATIENT)
Dept: PHYSICAL THERAPY | Facility: MEDICAL CENTER | Age: 27
End: 2022-08-04
Attending: NURSE PRACTITIONER
Payer: COMMERCIAL

## 2022-08-09 ENCOUNTER — APPOINTMENT (OUTPATIENT)
Dept: PHYSICAL THERAPY | Facility: MEDICAL CENTER | Age: 27
End: 2022-08-09
Attending: NURSE PRACTITIONER
Payer: COMMERCIAL

## 2022-08-11 ENCOUNTER — PHYSICAL THERAPY (OUTPATIENT)
Dept: PHYSICAL THERAPY | Facility: MEDICAL CENTER | Age: 27
End: 2022-08-11
Attending: NURSE PRACTITIONER
Payer: COMMERCIAL

## 2022-08-11 DIAGNOSIS — M25.561 RIGHT KNEE PAIN, UNSPECIFIED CHRONICITY: ICD-10-CM

## 2022-08-11 DIAGNOSIS — Z09 ENCOUNTER FOR FOLLOW-UP EXAMINATION AFTER COMPLETED TREATMENT FOR CONDITIONS OTHER THAN MALIGNANT NEOPLASM: ICD-10-CM

## 2022-08-11 DIAGNOSIS — S93.401D SPRAIN OF UNSPECIFIED LIGAMENT OF RIGHT ANKLE, SUBSEQUENT ENCOUNTER: ICD-10-CM

## 2022-08-11 PROCEDURE — 97014 ELECTRIC STIMULATION THERAPY: CPT

## 2022-08-11 PROCEDURE — 97110 THERAPEUTIC EXERCISES: CPT

## 2022-08-11 PROCEDURE — 97140 MANUAL THERAPY 1/> REGIONS: CPT

## 2022-08-11 NOTE — OP THERAPY DAILY TREATMENT
Outpatient Physical Therapy  DAILY TREATMENT     [facility] Outpatient Physical Therapy  [address]  [place] [address]  Phone:  [phone]  Fax:  [phone]    Date: 08/11/2022    Patient: Shira Sanchez  YOB: 1995  MRN: [id number]     Time Calculation    Start time: 0905  Stop time: 0959 Time Calculation (min): 54 minutes         Chief Complaint: Knee Problem    Visit #: 5    SUBJECTIVE:  Pt reporting still feels some pain when lying flat on stomach. Or when there is pressure on the knee.       OBJECTIVE:  Current objective measures:       See DC note    Therapeutic Exercises (CPT 99311):     1. pt education, re: WB on ankle for proprioception; edu re: massage healing around incision     5. upright bike , x5 mis, cardiovascular warm up    6. pt education, re: desensitization program for wound including different temperatures, different textures and gentle massage in multiple directions to ensure pliability of skin    7. review of hep    8. objective measures      Therapeutic Exercise Summary: Pt performed these exercises with instruction and SPV.  Provided handout with these exercises for daily HEP.      Therapeutic Treatments and Modalities:     2. E Stim Unattended (CPT 88124), IFC to R knee wound with contrast h-c-h    Therapeutic Treatment and Modalities Summary:     Time-based treatments/modalities:    Pain pre treatment: 0/10      ASSESSMENT:   Response to treatment:   See DC note    PLAN/RECOMMENDATIONS:   Plan for treatment: See DC note.

## 2022-08-11 NOTE — OP THERAPY DISCHARGE SUMMARY
Outpatient Physical Therapy  DISCHARGE SUMMARY NOTE      [facility] Outpatient Physical Therapy  [address]  [place] [address]  Phone:  [phone]  Fax:  [phone]    Date of Visit: 08/11/2022    Patient: Shira Sanchez  YOB: 1995  MRN: [id number]     Referring Provider: DENISE Madrid  [address] [place] [address]   Referring Diagnosis Encounter for follow-up examination after completed treatment for conditions other than malignant neoplasm [Z09];Pain in right knee [M25.561];Sprain of unspecified ligament of right ankle, subsequent encounter [B67.911F]         Functional Assessment Used        Your patient is being discharged from Physical Therapy with the following comments:   Goals met    Comments:   Pt has been seen for 5 visits. She demos good wound healing (wound is raised, pink, closed and healing well). Pt has returned to work and has no major ADL restrictions; she has remaining complaints of 1/10 pain when lying on her stomach in certain positions and placing pressure on wound. Additionally, burning sensation with touching wound or when pressure placed to area. Discussion today re: increased time needed for healing and desensitization program to initiate to help with residual s/s and sensitivity of wound. Pt demo WFL knee and ankle AROM without limitations or pain; demo good balance as observed last session with improving strength since evaluation. Pt will DC from PT today and focus on indep hep today and desensitization program. Edu pt to follow up with MD in several weeks if wound healing and residual s/s not improving.    Objective:  From eval: 87 deg R knee flexion AROM  Knee ROM today:   139 deg R knee flexion AROM while in hooklying  0 deg knee extension in lying     Ankle ROM DF knee to wall:  R: 4.5 in from wall  L: 5 in from wall     Wound observation:  Improved appearance of wound; no longer has significant  scabbing. Wound is closed, pink and raised.    Limitations Remaining:  See above.    Recommendations:  Pt has satisfied above listed goals and is ready to DC today with independent hep. She currently does not have ADL limitations that warrant further skilled PT sessions. All questions answered and verbally reviewed independent hep with pt. Expressed to pt that she can return to skilled physical therapy if condition should change or worsen in future.      Clovis Chicas, PT    Date: 8/11/2022

## 2022-08-15 ENCOUNTER — HOSPITAL ENCOUNTER (OUTPATIENT)
Dept: LAB | Facility: MEDICAL CENTER | Age: 27
End: 2022-08-15
Attending: PHYSICIAN ASSISTANT
Payer: COMMERCIAL

## 2022-08-15 PROCEDURE — 87591 N.GONORRHOEAE DNA AMP PROB: CPT

## 2022-08-15 PROCEDURE — 87491 CHLMYD TRACH DNA AMP PROBE: CPT

## 2022-08-15 PROCEDURE — 87624 HPV HI-RISK TYP POOLED RSLT: CPT

## 2022-08-15 PROCEDURE — 88175 CYTOPATH C/V AUTO FLUID REDO: CPT

## 2022-08-16 ENCOUNTER — APPOINTMENT (OUTPATIENT)
Dept: PHYSICAL THERAPY | Facility: MEDICAL CENTER | Age: 27
End: 2022-08-16
Attending: NURSE PRACTITIONER
Payer: COMMERCIAL

## 2022-08-16 LAB
C TRACH DNA GENITAL QL NAA+PROBE: NEGATIVE
CYTOLOGY REG CYTOL: NORMAL
HPV HR 12 DNA CVX QL NAA+PROBE: NEGATIVE
HPV16 DNA SPEC QL NAA+PROBE: NEGATIVE
HPV18 DNA SPEC QL NAA+PROBE: NEGATIVE
N GONORRHOEA DNA GENITAL QL NAA+PROBE: NEGATIVE
SPECIMEN SOURCE: NORMAL
SPECIMEN SOURCE: NORMAL

## 2022-08-23 ENCOUNTER — APPOINTMENT (OUTPATIENT)
Dept: PHYSICAL THERAPY | Facility: MEDICAL CENTER | Age: 27
End: 2022-08-23
Attending: NURSE PRACTITIONER
Payer: COMMERCIAL

## 2024-01-29 NOTE — Clinical Note
[facility]  [address] [address]     January 30, 2017    Patient: Shira Sanchez   YOB: 1995   Date of Visit: 10/25/2016       To Whom It May Concern:    Shira Sanchez was seen and treated in our department on 10/25/2016 for heartburn in pregnancy.           Sincerely,     HEMAL Luevano.                
No

## 2024-05-23 ENCOUNTER — OFFICE VISIT (OUTPATIENT)
Dept: MEDICAL GROUP | Facility: MEDICAL CENTER | Age: 29
End: 2024-05-23
Payer: COMMERCIAL

## 2024-05-23 VITALS
DIASTOLIC BLOOD PRESSURE: 64 MMHG | OXYGEN SATURATION: 99 % | TEMPERATURE: 97.8 F | HEIGHT: 61 IN | SYSTOLIC BLOOD PRESSURE: 100 MMHG | BODY MASS INDEX: 26.7 KG/M2 | RESPIRATION RATE: 18 BRPM | HEART RATE: 89 BPM | WEIGHT: 141.4 LBS

## 2024-05-23 DIAGNOSIS — R79.1 PROLONGED BLEEDING TIME: ICD-10-CM

## 2024-05-23 DIAGNOSIS — Z11.59 NEED FOR HEPATITIS C SCREENING TEST: ICD-10-CM

## 2024-05-23 DIAGNOSIS — Z00.00 ANNUAL PHYSICAL EXAM: ICD-10-CM

## 2024-05-23 DIAGNOSIS — R14.0 ABDOMINAL BLOATING: ICD-10-CM

## 2024-05-23 DIAGNOSIS — K90.41 NON-CELIAC GLUTEN SENSITIVITY: ICD-10-CM

## 2024-05-23 PROBLEM — R30.0 DYSURIA: Status: RESOLVED | Noted: 2017-11-02 | Resolved: 2024-05-23

## 2024-05-23 PROCEDURE — 3074F SYST BP LT 130 MM HG: CPT | Performed by: NURSE PRACTITIONER

## 2024-05-23 PROCEDURE — 99385 PREV VISIT NEW AGE 18-39: CPT | Performed by: NURSE PRACTITIONER

## 2024-05-23 PROCEDURE — 3078F DIAST BP <80 MM HG: CPT | Performed by: NURSE PRACTITIONER

## 2024-05-23 ASSESSMENT — ENCOUNTER SYMPTOMS
POLYDIPSIA: 0
COUGH: 0
WHEEZING: 0
SPUTUM PRODUCTION: 0
WEAKNESS: 0
EYE PAIN: 0
FOCAL WEAKNESS: 0
MYALGIAS: 0
ABDOMINAL PAIN: 0
CONSTIPATION: 0
LOSS OF CONSCIOUSNESS: 0
BRUISES/BLEEDS EASILY: 0
NERVOUS/ANXIOUS: 0
SPEECH CHANGE: 0
EYE REDNESS: 0
NAUSEA: 0
DIARRHEA: 0
FEVER: 0
WEIGHT LOSS: 0
HEADACHES: 0
BLOOD IN STOOL: 0
VOMITING: 0
EYE DISCHARGE: 0
SINUS PAIN: 0
SHORTNESS OF BREATH: 0
DEPRESSION: 0
SENSORY CHANGE: 0
TREMORS: 0
FLANK PAIN: 0
CHILLS: 0
DIZZINESS: 0
INSOMNIA: 0
PALPITATIONS: 0
SORE THROAT: 0
BACK PAIN: 0

## 2024-05-23 ASSESSMENT — PATIENT HEALTH QUESTIONNAIRE - PHQ9: CLINICAL INTERPRETATION OF PHQ2 SCORE: 0

## 2024-05-23 NOTE — PROGRESS NOTES
Patient agreed to using [facility]NA: yes    There are no diagnoses linked to this encounter.          Assessment & Plan  1. Abdominal bloating.  2.  Non- celiac gluten sensitivity  This is a chronic and stable condition for the patient. The patient acknowledges a gluten sensitivity and has been advised to abstain from gluten and dairy products. A GI evaluation is strongly recommended. An abdominal ultrasound will be obtained, and the patient will be informed of the results via [facility].    3. Prolonged bleeding.  This occurred during a dental examination a few months prior. Laboratory tests revealed elevated MPV. Normal platelets.  Will repeat blood work and review next visit.    4. Annual physical exam    History of Present Illness  This is a pleasant 29-year-old female who is here to establish care.    The patient's last annual physical examination was quite some time ago. She underwent laboratory tests at [facility] due to general malaise. She is not currently on any medications, has no chronic diagnosis, and does not take aspirin. She maintains a regular diet, engages in exercise thrice weekly, and applies sunscreen. She denies any substance or alcohol use. She is not currently using any form of birth control and has no concerns regarding sexually transmitted infections. She has no history of surgical procedures. She denies experiencing chest pain, shortness of breath, or migraines without a clear explanation. She reports chronic abdominal bloating, which is exacerbated by non-producing foods such as fruits and vegetables. She resides with her  home and consumes a significant amount of bread and tortillas. She maintains adequate hydration and denies any symptoms of anxiety, depression, or sleep disturbances.    The patient underwent a Pap smear several years ago, which yielded normal results. She has a history of an abnormal Pap smear result, necessitating further testing. However, a subsequent Pap smear  yielded positive results for HPV. She denies any possibility of pregnancy.    The patient visited a dentist approximately a year ago, during which her teeth were not coagulated. The dentist recommended a medical consultation due to the persistent bleeding. She has observed that minor bleeding occurs with each minor occurrence, a deviation from her previous experience.   She works as a landender.   She denies any family history.       Ob-Gyn/ History:    Last Pap Smear:  . no history of abnormal pap smears.    Health Maintenance  Below Anticipatory guidance discussed with patient  Cholesterol Screening:   Diabetes Screening: completed in labs  Aspirin Use: no    Diet: regular   Exercise: daily   Substance Abuse: no   Safe in relationship.   Seat belts, bike helmet, gun safety discussed.  Sun protection used.    Cancer screening   Cervical Cancer Screenin      Infectious disease screening/Immunizations  --STI Screening: no concerns   --Practices safe sex.  --HIV Screening:    --Hepatitis C Screening:    --Immunizations:      Review of Systems   Constitutional:  Negative for chills, fever, malaise/fatigue and weight loss.   HENT:  Negative for congestion, ear discharge, hearing loss, sinus pain and sore throat.    Eyes:  Negative for pain, discharge and redness.   Respiratory:  Negative for cough, sputum production, shortness of breath and wheezing.    Cardiovascular:  Negative for chest pain, palpitations and leg swelling.   Gastrointestinal:  Negative for abdominal pain, blood in stool, constipation, diarrhea, nausea and vomiting.   Genitourinary:  Negative for dysuria, flank pain, frequency, hematuria and urgency.   Musculoskeletal:  Negative for back pain, joint pain and myalgias.   Skin:  Negative for itching and rash.   Neurological:  Negative for dizziness, tremors, sensory change, speech change, focal weakness, loss of consciousness, weakness and headaches.   Endo/Heme/Allergies:  Negative for  "environmental allergies and polydipsia. Does not bruise/bleed easily.   Psychiatric/Behavioral:  Negative for depression. The patient is not nervous/anxious and does not have insomnia.         He  has no past medical history on file.  He  has no past surgical history on file.  Family History   Problem Relation Age of Onset    Other Mother         glaucoma    Other Father         glaucoma    No Known Problems Sister     No Known Problems Brother     Hypertension Maternal Grandfather     Diabetes Maternal Grandfather     Other Maternal Grandfather         glucoma     Social History     Tobacco Use    Smoking status: Never    Smokeless tobacco: Never   Vaping Use    Vaping status: Never Used   Substance Use Topics    Alcohol use: No     Comment: Socially but not during pregnancy    Drug use: No     Patient Active Problem List    Diagnosis Date Noted    Cystocele with uterine prolapse 11/02/2017    Uterine prolapse 11/02/2017    Urinary urgency 11/02/2017    Dyspareunia in female 11/02/2017    Incontinence of urine in female 11/02/2017     No current outpatient medications on file.     No current facility-administered medications for this visit.    (including changes today)  Allergies: Latex    /64 (BP Location: Left arm, Patient Position: Sitting, BP Cuff Size: Adult)   Pulse 89   Temp 36.6 °C (97.8 °F) (Temporal)   Resp 18   Ht 1.549 m (5' 1\")   Wt 64.1 kg (141 lb 6.4 oz)   SpO2 99%      Physical Exam  Constitutional:       General: She is not in acute distress.     Appearance: Normal appearance. She is normal weight. She is not ill-appearing.   HENT:      Head: Normocephalic and atraumatic.      Right Ear: Tympanic membrane, ear canal and external ear normal. There is no impacted cerumen.      Left Ear: Tympanic membrane, ear canal and external ear normal. There is no impacted cerumen.      Nose: Nose normal. No congestion or rhinorrhea.      Mouth/Throat:      Mouth: Mucous membranes are moist.      " Pharynx: No oropharyngeal exudate or posterior oropharyngeal erythema.   Eyes:      General:         Right eye: No discharge.         Left eye: No discharge.      Pupils: Pupils are equal, round, and reactive to light.   Cardiovascular:      Rate and Rhythm: Normal rate.      Pulses: Normal pulses.      Heart sounds: Normal heart sounds. No murmur heard.     No friction rub. No gallop.   Pulmonary:      Effort: Pulmonary effort is normal. No respiratory distress.      Breath sounds: Normal breath sounds. No wheezing, rhonchi or rales.   Abdominal:      General: Bowel sounds are normal. There is no distension.      Palpations: Abdomen is soft. There is no mass.      Tenderness: There is no abdominal tenderness. There is no right CVA tenderness, left CVA tenderness, guarding or rebound.      Hernia: No hernia is present.   Musculoskeletal:         General: No swelling, tenderness or deformity. Normal range of motion.      Cervical back: Normal range of motion and neck supple.      Right lower leg: No edema.      Left lower leg: No edema.   Lymphadenopathy:      Cervical: No cervical adenopathy.   Skin:     General: Skin is warm.      Findings: No rash.   Neurological:      General: No focal deficit present.      Mental Status: She is alert. Mental status is at baseline.      Cranial Nerves: No cranial nerve deficit.      Sensory: No sensory deficit.      Motor: No weakness.      Coordination: Coordination normal.      Gait: Gait normal.   Psychiatric:         Mood and Affect: Mood normal.         Behavior: Behavior normal.          Results  Laboratory Studies  Elevated MPV.       No follow-ups on file. 3 mos

## 2024-06-10 ENCOUNTER — OFFICE VISIT (OUTPATIENT)
Dept: MEDICAL GROUP | Facility: MEDICAL CENTER | Age: 29
End: 2024-06-10
Payer: COMMERCIAL

## 2024-06-10 ENCOUNTER — HOSPITAL ENCOUNTER (OUTPATIENT)
Facility: MEDICAL CENTER | Age: 29
End: 2024-06-10
Attending: FAMILY MEDICINE
Payer: COMMERCIAL

## 2024-06-10 VITALS
HEART RATE: 90 BPM | BODY MASS INDEX: 26.81 KG/M2 | OXYGEN SATURATION: 97 % | TEMPERATURE: 98.2 F | HEIGHT: 61 IN | WEIGHT: 142 LBS | DIASTOLIC BLOOD PRESSURE: 58 MMHG | SYSTOLIC BLOOD PRESSURE: 104 MMHG

## 2024-06-10 DIAGNOSIS — N30.01 ACUTE CYSTITIS WITH HEMATURIA: ICD-10-CM

## 2024-06-10 LAB
APPEARANCE UR: NORMAL
BILIRUB UR STRIP-MCNC: NORMAL MG/DL
COLOR UR AUTO: NORMAL
GLUCOSE UR STRIP.AUTO-MCNC: NORMAL MG/DL
KETONES UR STRIP.AUTO-MCNC: NORMAL MG/DL
LEUKOCYTE ESTERASE UR QL STRIP.AUTO: NORMAL
NITRITE UR QL STRIP.AUTO: NORMAL
PH UR STRIP.AUTO: 7 [PH] (ref 5–8)
PROT UR QL STRIP: 100 MG/DL
RBC UR QL AUTO: NORMAL
SP GR UR STRIP.AUTO: 1.02
UROBILINOGEN UR STRIP-MCNC: 0.2 MG/DL

## 2024-06-10 PROCEDURE — 87086 URINE CULTURE/COLONY COUNT: CPT

## 2024-06-10 PROCEDURE — 3078F DIAST BP <80 MM HG: CPT | Performed by: FAMILY MEDICINE

## 2024-06-10 PROCEDURE — 87077 CULTURE AEROBIC IDENTIFY: CPT

## 2024-06-10 PROCEDURE — 99213 OFFICE O/P EST LOW 20 MIN: CPT | Performed by: FAMILY MEDICINE

## 2024-06-10 PROCEDURE — 3074F SYST BP LT 130 MM HG: CPT | Performed by: FAMILY MEDICINE

## 2024-06-10 PROCEDURE — 81002 URINALYSIS NONAUTO W/O SCOPE: CPT | Performed by: FAMILY MEDICINE

## 2024-06-10 PROCEDURE — 87186 SC STD MICRODIL/AGAR DIL: CPT

## 2024-06-10 RX ORDER — NITROFURANTOIN 25; 75 MG/1; MG/1
100 CAPSULE ORAL 2 TIMES DAILY
Qty: 10 CAPSULE | Refills: 0 | Status: SHIPPED | OUTPATIENT
Start: 2024-06-10

## 2024-06-10 RX ORDER — PHENAZOPYRIDINE HYDROCHLORIDE 200 MG/1
200 TABLET, FILM COATED ORAL 3 TIMES DAILY PRN
Qty: 6 TABLET | Refills: 0 | Status: SHIPPED | OUTPATIENT
Start: 2024-06-10

## 2024-06-10 NOTE — PROGRESS NOTES
Verbal consent was acquired by the patient to use [employer] ambient listening note generation during this visit:  Yes      Chief complaint::The encounter diagnosis was Acute cystitis with hematuria.    Assessment and Plan:   The following treatment plan was discussed:     Assessment & Plan  1. Acute cystitis with hematuria.  Upon reviewing the urinalysis results, it was observed that the patient tested positive for leukocyte esterase, but tested negative for blood. However, the presence of protein was also noted. The patient will be treated for a urinary tract infection with Macrobid 100 mg twice daily and Pyridium 200 mg up to three times daily as needed for dysuria. A urine culture was also ordered.    Follow-up  The patient is advised to follow up as necessary.  Shira was seen today for uti.    Diagnoses and all orders for this visit:    Acute cystitis with hematuria  -     POCT Urinalysis  -     URINE CULTURE(NEW); Future  -     nitrofurantoin (MACROBID) 100 MG Cap; Take 1 Capsule by mouth 2 times a day.  -     phenazopyridine (PYRIDIUM) 200 MG Tab; Take 1 Tablet by mouth 3 times a day as needed for Severe Pain or Moderate Pain.        Followup: Return if symptoms worsen or fail to improve.    Subjective/HPI:   HPI:    Shira Sanchez is a pleasant 29 y.o. female here for   Chief Complaint   Patient presents with    UTI     Blood in urine, pain, cloudy         History of Present Illness  The patient is a 29-year-old female who is here for dysuria.    The patient reported experiencing severe abdominal pain upon awakening at approximately 9:30 a.m. today, which rendered her unable to ambulate. Upon urination, she experienced bloating and difficulty urinating, which was followed by dysuria and hematuria. Despite maintaining adequate hydration, she is currently experiencing difficulty urinating due to an increased frequency of urination. She has a scheduled appointment with a urogynecologist at the end of  "06/2024. In 2017, she was referred to urology and gynecology, during which she was informed that her condition was significant enough to warrant a hysterectomy. However, she has not experienced frequent urinary tract infections since 2017. She does not recall ever having a urinary tract infection, but was diagnosed with interstitial cystitis several years ago. She denies experiencing low back pain, nausea, or fevers.    Current Medicines (including changes today)  Current Outpatient Medications   Medication Sig Dispense Refill    nitrofurantoin (MACROBID) 100 MG Cap Take 1 Capsule by mouth 2 times a day. 10 Capsule 0    phenazopyridine (PYRIDIUM) 200 MG Tab Take 1 Tablet by mouth 3 times a day as needed for Severe Pain or Moderate Pain. 6 Tablet 0     No current facility-administered medications for this visit.     Past Medical/ Surgical History  She  has no past medical history on file.  She  has no past surgical history on file.       Objective:   /58   Pulse 90   Temp 36.8 °C (98.2 °F)   Ht 1.549 m (5' 1\")   Wt 64.4 kg (142 lb)   SpO2 97%  Body mass index is 26.83 kg/m².    Physical Exam  Constitutional:       General: She is not in acute distress.  HENT:      Head: Normocephalic and atraumatic.      Right Ear: Tympanic membrane and external ear normal.      Left Ear: Tympanic membrane and external ear normal.      Nose: No nasal deformity.      Mouth/Throat:      Lips: Pink.      Mouth: Mucous membranes are moist.      Pharynx: Oropharynx is clear. Uvula midline. No posterior oropharyngeal erythema.   Eyes:      General: Lids are normal.      Extraocular Movements: Extraocular movements intact.      Conjunctiva/sclera: Conjunctivae normal.      Pupils: Pupils are equal, round, and reactive to light.   Neck:      Trachea: Trachea normal.   Cardiovascular:      Rate and Rhythm: Normal rate and regular rhythm.      Heart sounds: Normal heart sounds. No murmur heard.     No friction rub. No gallop. "   Pulmonary:      Effort: Pulmonary effort is normal. No accessory muscle usage.      Breath sounds: Normal breath sounds. No wheezing or rales.   Abdominal:      General: Bowel sounds are normal.      Palpations: Abdomen is soft.      Tenderness: There is abdominal tenderness in the right upper quadrant, right lower quadrant, suprapubic area, left upper quadrant and left lower quadrant. There is no right CVA tenderness or left CVA tenderness.   Musculoskeletal:      Cervical back: Normal range of motion and neck supple.      Right lower leg: No edema.      Left lower leg: No edema.   Lymphadenopathy:      Cervical: No cervical adenopathy.   Skin:     General: Skin is warm and dry.      Findings: No rash.   Neurological:      General: No focal deficit present.      Mental Status: She is alert and oriented to person, place, and time. Mental status is at baseline.      GCS: GCS eye subscore is 4. GCS verbal subscore is 5. GCS motor subscore is 6.      Motor: No weakness.      Gait: Gait is intact.   Psychiatric:         Attention and Perception: Attention normal.         Mood and Affect: Mood and affect normal.         Speech: Speech normal.          Lab/ Imaging Results:  Results  Laboratory Studies  Urinalysis positive for leukocyte esterase, trace ketones, large blood, positive proteins    Please note that this dictation was created using voice recognition software. I have made every reasonable attempt to correct obvious errors, but I expect that there are errors of grammar and possibly content that I did not discover before finalizing the note.

## 2024-06-24 ENCOUNTER — HOSPITAL ENCOUNTER (OUTPATIENT)
Dept: RADIOLOGY | Facility: MEDICAL CENTER | Age: 29
End: 2024-06-24
Attending: NURSE PRACTITIONER
Payer: COMMERCIAL

## 2024-06-24 DIAGNOSIS — R14.0 ABDOMINAL BLOATING: ICD-10-CM

## 2024-06-24 PROCEDURE — 76830 TRANSVAGINAL US NON-OB: CPT

## 2024-07-09 ENCOUNTER — HOSPITAL ENCOUNTER (OUTPATIENT)
Facility: MEDICAL CENTER | Age: 29
End: 2024-07-09
Attending: PHYSICIAN ASSISTANT
Payer: COMMERCIAL

## 2024-07-09 PROCEDURE — 88175 CYTOPATH C/V AUTO FLUID REDO: CPT

## 2024-07-09 PROCEDURE — 87491 CHLMYD TRACH DNA AMP PROBE: CPT

## 2024-07-09 PROCEDURE — 87591 N.GONORRHOEAE DNA AMP PROB: CPT

## 2024-07-12 LAB
C TRACH RRNA CVX QL NAA+PROBE: NEGATIVE
COMMENT NL11729A: NORMAL
CYTOLOGIST CVX/VAG CYTO: NORMAL
CYTOLOGY CVX/VAG DOC CYTO: NORMAL
CYTOLOGY CVX/VAG DOC THIN PREP: NORMAL
N GONORRHOEA RRNA CVX QL NAA+PROBE: NEGATIVE
NOTE NL11727A: NORMAL
OTHER STN SPEC: NORMAL
STAT OF ADQ CVX/VAG CYTO-IMP: NORMAL

## 2024-09-26 ENCOUNTER — OFFICE VISIT (OUTPATIENT)
Dept: MEDICAL GROUP | Facility: MEDICAL CENTER | Age: 29
End: 2024-09-26
Payer: COMMERCIAL

## 2024-09-26 VITALS
DIASTOLIC BLOOD PRESSURE: 80 MMHG | OXYGEN SATURATION: 98 % | HEART RATE: 93 BPM | HEIGHT: 61 IN | BODY MASS INDEX: 27.38 KG/M2 | TEMPERATURE: 97.8 F | SYSTOLIC BLOOD PRESSURE: 122 MMHG | RESPIRATION RATE: 19 BRPM | WEIGHT: 145 LBS

## 2024-09-26 DIAGNOSIS — N30.10 INTERSTITIAL CYSTITIS: ICD-10-CM

## 2024-09-26 DIAGNOSIS — R14.0 ABDOMINAL BLOATING: ICD-10-CM

## 2024-09-26 DIAGNOSIS — N81.4 CYSTOCELE WITH UTERINE PROLAPSE: ICD-10-CM

## 2024-09-26 DIAGNOSIS — N39.3 FEMALE STRESS INCONTINENCE: ICD-10-CM

## 2024-09-26 PROBLEM — R79.1 PROLONGED BLEEDING TIME: Status: RESOLVED | Noted: 2024-05-23 | Resolved: 2024-09-26

## 2024-09-26 PROBLEM — R39.15 URINARY URGENCY: Status: RESOLVED | Noted: 2017-11-02 | Resolved: 2024-09-26

## 2024-09-26 PROCEDURE — 3074F SYST BP LT 130 MM HG: CPT | Performed by: NURSE PRACTITIONER

## 2024-09-26 PROCEDURE — 3079F DIAST BP 80-89 MM HG: CPT | Performed by: NURSE PRACTITIONER

## 2024-09-26 PROCEDURE — 99214 OFFICE O/P EST MOD 30 MIN: CPT | Performed by: NURSE PRACTITIONER

## 2024-09-26 ASSESSMENT — ENCOUNTER SYMPTOMS
CHILLS: 0
FEVER: 0
PALPITATIONS: 0

## 2024-09-26 NOTE — PROGRESS NOTES
Patient agreed to use of [employer]: yes  Chief Complaint   Patient presents with    Lab Results       HISTORY OF PRESENT ILLNESS: Patient is a pleasant 29 y.o. female, established patient who presents today to discuss medical problems as listed below:    Assessment/Plan:    Shira was seen today for lab results.    Diagnoses and all orders for this visit:    Abdominal bloating    Interstitial cystitis  -     Referral to Physical Therapy    Cystocele with uterine prolapse  -     Referral to Physical Therapy    Female stress incontinence  -     Referral to Physical Therapy              Assessment & Plan  1. Bloating.  Her symptoms suggest intolerance to certain foods, avoiding gluten ,and dairy. She has been advised to avoid soda, energy drinks, coffee, and other stimulants. No imaging is deemed necessary at this time as her symptoms have improved with dietary changes.    2. Dyspareunia.  She continues to experience painful intimacy, which may be related to interstitial cystitis. She has been advised to avoid triggers such as certain foods and stress.    3. Interstitial Cystitis.  She reports that certain foods and stress trigger her symptoms. She has been advised to avoid soda, energy drinks, coffee, and other stimulants.    4. Stress Incontinence.  She experiences stress incontinence with activities such as jumping, sneezing, and laughing. Physical therapy for pelvic floor training has been recommended.        History of Present Illness  The patient presents for evaluation of multiple medical concerns.    She reports experiencing bloating after meals, which has been somewhat alleviated by reducing her sugar intake.    She mentions a history of urinary tract infections (UTIs), for which she was previously treated with Pyridium and Macrobid. Currently, she is not on any medication for UTIs.    She experiences painful intercourse, which she attributes to interstitial cystitis, a condition diagnosed by her urologist. She  "identifies soda consumption and emotional distress as triggers for her symptoms. She reports no bleeding but admits to occasional bladder leakage when sneezing or jumping. Despite attempts at home physical therapy, she has not found relief.    Health Maintenance:  COMPLETED     Allergies, past medical history, past surgical history, family history, social history reviewed and updated.    Review of Systems   Constitutional:  Negative for chills, fever and malaise/fatigue.   Cardiovascular:  Negative for chest pain, palpitations and leg swelling.   Genitourinary:         Stress incontinence        Exam:    /80   Pulse 93   Temp 36.6 °C (97.8 °F) (Temporal)   Resp 19   Ht 1.549 m (5' 1\")   Wt 65.8 kg (145 lb)   SpO2 98%   BMI 27.40 kg/m²  Body mass index is 27.4 kg/m².    Physical Exam  Constitutional:       General: She is not in acute distress.     Appearance: She is not ill-appearing.   HENT:      Head: Normocephalic and atraumatic.      Nose: Nose normal.   Eyes:      General:         Right eye: No discharge.         Left eye: No discharge.   Cardiovascular:      Rate and Rhythm: Normal rate.      Pulses: Normal pulses.      Heart sounds: Normal heart sounds. No murmur heard.  Pulmonary:      Effort: Pulmonary effort is normal. No respiratory distress.      Breath sounds: Normal breath sounds. No stridor. No wheezing or rales.   Skin:     Findings: No rash.   Neurological:      General: No focal deficit present.      Mental Status: She is alert. Mental status is at baseline.   Psychiatric:         Mood and Affect: Mood normal.         Behavior: Behavior normal.          Results            Discussed with patient possible alternative diagnoses, patient is to take all medications as prescribed.      If symptoms persist FU w/PCP, if symptoms worsen go to emergency room.      If experiencing any side effects from prescribed medications report to the office immediately or go to emergency room.     Reviewed " indication, dosage, usage and potential adverse effects of prescribed medications.      Reviewed risks and benefits of treatment plan. Patient verbalizes understanding of all instruction and verbally agrees to plan.     Discussed plan with the patient, and patient agrees to the above.      I personally reviewed prior external notes and test results pertinent to today's visit.      No follow-ups on file. Annual, PRN

## 2024-12-11 ENCOUNTER — HOSPITAL ENCOUNTER (OUTPATIENT)
Facility: MEDICAL CENTER | Age: 29
End: 2024-12-11
Attending: PHYSICIAN ASSISTANT
Payer: COMMERCIAL

## 2024-12-11 LAB
C TRACH DNA GENITAL QL NAA+PROBE: NEGATIVE
N GONORRHOEA DNA GENITAL QL NAA+PROBE: NEGATIVE
SPECIMEN SOURCE: NORMAL

## 2024-12-11 PROCEDURE — 87491 CHLMYD TRACH DNA AMP PROBE: CPT

## 2024-12-11 PROCEDURE — 87591 N.GONORRHOEAE DNA AMP PROB: CPT
